# Patient Record
Sex: MALE | Race: WHITE | NOT HISPANIC OR LATINO
[De-identification: names, ages, dates, MRNs, and addresses within clinical notes are randomized per-mention and may not be internally consistent; named-entity substitution may affect disease eponyms.]

---

## 2017-01-23 ENCOUNTER — APPOINTMENT (OUTPATIENT)
Dept: PULMONOLOGY | Facility: CLINIC | Age: 82
End: 2017-01-23

## 2017-01-23 VITALS
HEIGHT: 74 IN | WEIGHT: 203 LBS | BODY MASS INDEX: 26.05 KG/M2 | RESPIRATION RATE: 16 BRPM | HEART RATE: 77 BPM | DIASTOLIC BLOOD PRESSURE: 70 MMHG | SYSTOLIC BLOOD PRESSURE: 140 MMHG | OXYGEN SATURATION: 97 %

## 2017-06-05 ENCOUNTER — APPOINTMENT (OUTPATIENT)
Dept: PULMONOLOGY | Facility: CLINIC | Age: 82
End: 2017-06-05

## 2017-06-05 VITALS
RESPIRATION RATE: 17 BRPM | HEIGHT: 74 IN | WEIGHT: 206 LBS | SYSTOLIC BLOOD PRESSURE: 120 MMHG | HEART RATE: 86 BPM | DIASTOLIC BLOOD PRESSURE: 80 MMHG | OXYGEN SATURATION: 95 % | BODY MASS INDEX: 26.44 KG/M2

## 2017-10-05 ENCOUNTER — APPOINTMENT (OUTPATIENT)
Dept: PULMONOLOGY | Facility: CLINIC | Age: 82
End: 2017-10-05
Payer: MEDICARE

## 2017-10-05 VITALS
WEIGHT: 190 LBS | BODY MASS INDEX: 24.38 KG/M2 | OXYGEN SATURATION: 97 % | HEART RATE: 70 BPM | HEIGHT: 74 IN | DIASTOLIC BLOOD PRESSURE: 70 MMHG | RESPIRATION RATE: 17 BRPM | SYSTOLIC BLOOD PRESSURE: 122 MMHG

## 2017-10-05 PROCEDURE — 94729 DIFFUSING CAPACITY: CPT

## 2017-10-05 PROCEDURE — 94010 BREATHING CAPACITY TEST: CPT | Mod: 59

## 2017-10-05 PROCEDURE — 90662 IIV NO PRSV INCREASED AG IM: CPT

## 2017-10-05 PROCEDURE — 94620 PULMONARY STRESS TESTING SIMPLE: CPT

## 2017-10-05 PROCEDURE — 99214 OFFICE O/P EST MOD 30 MIN: CPT | Mod: 25

## 2017-10-05 PROCEDURE — G0008: CPT

## 2018-02-06 ENCOUNTER — APPOINTMENT (OUTPATIENT)
Dept: PULMONOLOGY | Facility: CLINIC | Age: 83
End: 2018-02-06
Payer: MEDICARE

## 2018-02-06 VITALS
DIASTOLIC BLOOD PRESSURE: 58 MMHG | SYSTOLIC BLOOD PRESSURE: 106 MMHG | WEIGHT: 190 LBS | BODY MASS INDEX: 25.73 KG/M2 | RESPIRATION RATE: 17 BRPM | HEIGHT: 72 IN | HEART RATE: 83 BPM | OXYGEN SATURATION: 97 %

## 2018-02-06 PROCEDURE — 99214 OFFICE O/P EST MOD 30 MIN: CPT

## 2018-06-07 ENCOUNTER — NON-APPOINTMENT (OUTPATIENT)
Age: 83
End: 2018-06-07

## 2018-06-07 ENCOUNTER — APPOINTMENT (OUTPATIENT)
Dept: PULMONOLOGY | Facility: CLINIC | Age: 83
End: 2018-06-07
Payer: MEDICARE

## 2018-06-07 VITALS
HEART RATE: 89 BPM | BODY MASS INDEX: 26.55 KG/M2 | DIASTOLIC BLOOD PRESSURE: 78 MMHG | RESPIRATION RATE: 17 BRPM | HEIGHT: 72 IN | SYSTOLIC BLOOD PRESSURE: 124 MMHG | WEIGHT: 196 LBS | OXYGEN SATURATION: 95 %

## 2018-06-07 PROCEDURE — 94010 BREATHING CAPACITY TEST: CPT

## 2018-06-07 PROCEDURE — 99214 OFFICE O/P EST MOD 30 MIN: CPT | Mod: 25
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
## 2018-09-16 ENCOUNTER — TRANSCRIPTION ENCOUNTER (OUTPATIENT)
Age: 83
End: 2018-09-16

## 2018-09-17 ENCOUNTER — OUTPATIENT (OUTPATIENT)
Dept: OUTPATIENT SERVICES | Facility: HOSPITAL | Age: 83
LOS: 1 days | End: 2018-09-17
Payer: MEDICARE

## 2018-09-17 VITALS
SYSTOLIC BLOOD PRESSURE: 169 MMHG | DIASTOLIC BLOOD PRESSURE: 77 MMHG | WEIGHT: 200.18 LBS | OXYGEN SATURATION: 97 % | TEMPERATURE: 98 F | HEART RATE: 79 BPM | HEIGHT: 69.5 IN | RESPIRATION RATE: 20 BRPM

## 2018-09-17 VITALS
HEART RATE: 84 BPM | OXYGEN SATURATION: 96 % | RESPIRATION RATE: 15 BRPM | SYSTOLIC BLOOD PRESSURE: 146 MMHG | DIASTOLIC BLOOD PRESSURE: 92 MMHG

## 2018-09-17 DIAGNOSIS — Z41.9 ENCOUNTER FOR PROCEDURE FOR PURPOSES OTHER THAN REMEDYING HEALTH STATE, UNSPECIFIED: Chronic | ICD-10-CM

## 2018-09-17 DIAGNOSIS — Z95.9 PRESENCE OF CARDIAC AND VASCULAR IMPLANT AND GRAFT, UNSPECIFIED: Chronic | ICD-10-CM

## 2018-09-17 DIAGNOSIS — Z96.659 PRESENCE OF UNSPECIFIED ARTIFICIAL KNEE JOINT: Chronic | ICD-10-CM

## 2018-09-17 DIAGNOSIS — H25.12 AGE-RELATED NUCLEAR CATARACT, LEFT EYE: ICD-10-CM

## 2018-09-17 PROCEDURE — V2632: CPT

## 2018-09-17 PROCEDURE — 66984 XCAPSL CTRC RMVL W/O ECP: CPT | Mod: LT

## 2018-09-17 NOTE — ASU DISCHARGE PLAN (ADULT/PEDIATRIC). - PT EDUC
other (specify)/Implant card (specify) other (specify)/Implant card (specify)/Intraocular lens implant (IOL) Eye shield with instructions , sunglasses and eye kit given to patient. Left eye phacoemulsification cataract extraction with Intraocular Lens implant

## 2018-09-17 NOTE — ASU DISCHARGE PLAN (ADULT/PEDIATRIC). - SPECIAL INSTRUCTIONS
No bending, No heavy lifting.   Continue with current eyedrops.  Please wear clear shield at bed time for the first week.  Doctor will make arrangement for follow up.

## 2018-09-24 PROBLEM — I25.10 ATHEROSCLEROTIC HEART DISEASE OF NATIVE CORONARY ARTERY WITHOUT ANGINA PECTORIS: Chronic | Status: ACTIVE | Noted: 2018-09-17

## 2018-09-24 PROBLEM — G47.30 SLEEP APNEA, UNSPECIFIED: Chronic | Status: ACTIVE | Noted: 2018-09-17

## 2018-09-24 PROBLEM — Z87.19 PERSONAL HISTORY OF OTHER DISEASES OF THE DIGESTIVE SYSTEM: Chronic | Status: ACTIVE | Noted: 2018-09-17
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
## 2018-09-24 PROBLEM — N36.9 URETHRAL DISORDER, UNSPECIFIED: Chronic | Status: ACTIVE | Noted: 2018-09-17

## 2018-09-24 PROBLEM — I10 ESSENTIAL (PRIMARY) HYPERTENSION: Chronic | Status: ACTIVE | Noted: 2018-09-17

## 2018-09-24 PROBLEM — J84.10 PULMONARY FIBROSIS, UNSPECIFIED: Chronic | Status: ACTIVE | Noted: 2018-09-17
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
## 2018-09-30 ENCOUNTER — TRANSCRIPTION ENCOUNTER (OUTPATIENT)
Age: 83
End: 2018-09-30

## 2018-10-01 ENCOUNTER — OUTPATIENT (OUTPATIENT)
Dept: OUTPATIENT SERVICES | Facility: HOSPITAL | Age: 83
LOS: 1 days | End: 2018-10-01
Payer: MEDICARE

## 2018-10-01 VITALS
HEART RATE: 76 BPM | DIASTOLIC BLOOD PRESSURE: 58 MMHG | OXYGEN SATURATION: 99 % | SYSTOLIC BLOOD PRESSURE: 143 MMHG | RESPIRATION RATE: 23 BRPM

## 2018-10-01 VITALS
HEIGHT: 71 IN | DIASTOLIC BLOOD PRESSURE: 64 MMHG | SYSTOLIC BLOOD PRESSURE: 173 MMHG | RESPIRATION RATE: 18 BRPM | TEMPERATURE: 98 F | HEART RATE: 72 BPM | WEIGHT: 197.98 LBS | OXYGEN SATURATION: 100 %

## 2018-10-01 DIAGNOSIS — Z96.659 PRESENCE OF UNSPECIFIED ARTIFICIAL KNEE JOINT: Chronic | ICD-10-CM

## 2018-10-01 DIAGNOSIS — Z98.49 CATARACT EXTRACTION STATUS, UNSPECIFIED EYE: Chronic | ICD-10-CM

## 2018-10-01 DIAGNOSIS — Z41.9 ENCOUNTER FOR PROCEDURE FOR PURPOSES OTHER THAN REMEDYING HEALTH STATE, UNSPECIFIED: Chronic | ICD-10-CM

## 2018-10-01 DIAGNOSIS — H25.11 AGE-RELATED NUCLEAR CATARACT, RIGHT EYE: ICD-10-CM

## 2018-10-01 DIAGNOSIS — Z95.9 PRESENCE OF CARDIAC AND VASCULAR IMPLANT AND GRAFT, UNSPECIFIED: Chronic | ICD-10-CM

## 2018-10-01 PROCEDURE — 66984 XCAPSL CTRC RMVL W/O ECP: CPT | Mod: RT

## 2018-10-01 PROCEDURE — V2632: CPT

## 2018-10-01 NOTE — ASU DISCHARGE PLAN (ADULT/PEDIATRIC). - SPECIAL INSTRUCTIONS
No bending, No heavy lifting.   Continue with current eyedrops.  Please wear clear shield at bed time for the first week.  Doctor will make arrangement to see the patient(home visit).

## 2018-10-09 ENCOUNTER — APPOINTMENT (OUTPATIENT)
Dept: PULMONOLOGY | Facility: CLINIC | Age: 83
End: 2018-10-09

## 2018-10-12 ENCOUNTER — APPOINTMENT (OUTPATIENT)
Dept: PULMONOLOGY | Facility: CLINIC | Age: 83
End: 2018-10-12
Payer: MEDICARE

## 2018-10-12 ENCOUNTER — NON-APPOINTMENT (OUTPATIENT)
Age: 83
End: 2018-10-12

## 2018-10-12 VITALS
OXYGEN SATURATION: 94 % | BODY MASS INDEX: 34.73 KG/M2 | WEIGHT: 196 LBS | HEART RATE: 85 BPM | RESPIRATION RATE: 18 BRPM | SYSTOLIC BLOOD PRESSURE: 150 MMHG | DIASTOLIC BLOOD PRESSURE: 70 MMHG | HEIGHT: 63 IN

## 2018-10-12 PROCEDURE — 94010 BREATHING CAPACITY TEST: CPT

## 2018-10-12 PROCEDURE — 99214 OFFICE O/P EST MOD 30 MIN: CPT | Mod: 25

## 2018-10-12 PROCEDURE — 99070 SPECIAL SUPPLIES PHYS/QHP: CPT

## 2018-10-12 RX ORDER — AMITRIPTYLINE HYDROCHLORIDE 10 MG/1
10 TABLET, FILM COATED ORAL 3 TIMES DAILY
Qty: 1 | Refills: 5 | Status: DISCONTINUED | COMMUNITY
Start: 2018-06-07 | End: 2018-10-12

## 2018-10-12 RX ORDER — ATENOLOL 25 MG/1
25 TABLET ORAL
Refills: 0 | Status: ACTIVE | COMMUNITY

## 2018-11-16 ENCOUNTER — APPOINTMENT (OUTPATIENT)
Dept: PULMONOLOGY | Facility: CLINIC | Age: 83
End: 2018-11-16
Payer: MEDICARE

## 2018-11-16 VITALS
WEIGHT: 197 LBS | BODY MASS INDEX: 25.28 KG/M2 | HEIGHT: 74 IN | RESPIRATION RATE: 17 BRPM | DIASTOLIC BLOOD PRESSURE: 60 MMHG | SYSTOLIC BLOOD PRESSURE: 126 MMHG | HEART RATE: 75 BPM | OXYGEN SATURATION: 97 %

## 2018-11-16 PROCEDURE — 99214 OFFICE O/P EST MOD 30 MIN: CPT | Mod: 25

## 2018-11-16 PROCEDURE — 71046 X-RAY EXAM CHEST 2 VIEWS: CPT

## 2018-11-16 RX ORDER — GABAPENTIN 100 MG/1
100 CAPSULE ORAL
Qty: 180 | Refills: 1 | Status: ACTIVE | COMMUNITY
Start: 2018-11-16 | End: 1900-01-01

## 2018-11-16 NOTE — ADDENDUM
[FreeTextEntry1] : All medical record entries made by khang Cardenas were at Dr. Talon Crowell's, direction and personally dictated by me on (11/16/2018). I have reviewed the chart and agree that the record accurately reflects my personal performance of the history, physical exam, assessment and plan. I have also personally directed, reviewed, and agree with the discharge instructions.

## 2018-11-16 NOTE — HISTORY OF PRESENT ILLNESS
[FreeTextEntry1] : Mr. Benítez is a 83 year old male with a history of abnormal chest CT, bronchiectasis, COPD with asthma, GERD, ILDz, lung nodule, VANE, PND, PNA and SOB presenting to the office today for a follow up visit. His chief complaint is cough,. \par -he had a recent episode of PNA, and has still been coughing \par -he states that his coughing has been constant, and that he has been bringing up some mucous\par -he states that the cold air has been making him cough even more\par -he states that his energy level has returned to normal since having PNA\par -he state that he had not been feeling well when he was on amitriptyline\par -he notes that he has not been using his nebulizer regularly\par -he denies any headaches, nausea, vomiting, fever, chills, sweats, chest pain, chest pressure, diarrhea, constipation, dysphagia, dizziness, leg swelling, leg pain, itchy eyes, itchy ears, heartburn, reflux, or sour taste in the mouth.

## 2018-11-16 NOTE — PROCEDURE
[FreeTextEntry1] : Chest CT (September 28, 2018) reveals stable moderate degree of pulmonary intertitial fibrosis. \par \par \par CXR reveals a normal sized heart;  mildly increased interstitial markings b/l, left greater than right. calcified aortic knob.

## 2018-11-16 NOTE — REASON FOR VISIT
[Follow-Up] : a follow-up visit [FreeTextEntry1] : abnormal chest CT, bronchiectasis, COPD with asthma, GERD, ILDz, lung nodule, VANE, PND, PNA and SOB

## 2018-11-16 NOTE — ASSESSMENT
[FreeTextEntry1] : Mr. Benítez is a 83 year old male with a history of ILDz, COPD, asbestos exposure is stable from a pulmonary perspective, with some dyspnea when walking up stairs or riding bike up inclines. He presents with  a possible flair of COPD or UIP. \par \par His shortness of breath is multifactorial due to:\par -mild pulmonary fibrosis, stable \par -COPD\par -pulmonary hypertension\par -overweight\par -poor breathing mechanics\par \par problem 1: COPD (flair) \par -add Prednisone 20 mg x 7 days, 10 mg x 7 days \par Information sheet given to the patient to be reviewed, this medication is never to be used without consulting the prescribing physician. Proper dietary restraint is necessary specifically salt containing foods, if any reaction may occur should be reported.\par \par -continue to use Anoro at 1 inhalation QD\par -add Albuterol via Hand held nebulizer, Q6H \par -COPD is a progressive disease and although it can’t be cured , appropriate management can slow its progression, reduce frequency and severity of exacerbations, and improve symptoms and the patient quality of life. Hospitalizations are the greatest contributor to the total COPD costs and account for up to 87% of total COPD related costs. Exacerbations are the main cause of admissions and subsequently account for the 40-75% of COPD costs. Inhaled maintenance therapy reduces the incidence of exacerbations in patients with stable COPD. Incorrect inhaler use and nonadherence are major obstacles to achieving COPD treatment goals. Many COPD patients have challenges (impaired inhalation, limited dexterity, reduced cognition: that limit their ability to correctly use their COPD treatment devices resulting in reduced symptom control. Of most importance is smoking cessation and early intervention with respiratory illnesses and contemplation for pulmonary rehab to enhance quality of life. \par -Inhaler technique reviewed as well as oral hygiene techniques reviewed with patient. Avoidance of cold air, extremes of temperature, rescue inhaler should be used before exercise. Order of medication reviewed with patient. Recommended use of a cool mist humidifier in the bedroom. \par \par problem 2: asbestos exposure and former smoking, stable\par -follow up chest CT in August 2019\par \par problem 3: VANE\par -continue to use Breath Right strips\par -Provent is recommended \par -recommended to use a Oxy-Aid by [employer]\par -Discussed the risks/associations with coronary artery disease, atrial fibrillation, arrhythmia, memory loss, issues with concentration, stroke risk, hypertension, nocturia, chronic reflux/Disla’s esophagus some but not all inclusive. Treatment options discussed including CPAP/BiPAP machine, oral appliance, ProVent therapy, Oxy-Aid by [employer], new technologies, or positional sleep.\par \par problem 4: overweight\par -Weight loss, exercise, and diet control were discussed and are highly encouraged. Treatment options were given such as, aqua therapy, and contacting a nutritionist. Recommended to use the elliptical, stationary bike, less use of treadmill. \par \par problem 5: cough /  sensory neuropathic cough\par -recommended to use a humidifier in the bedroom\par -recommended to hydrate\par -recommended to use Mouth Kote \par -Xlear \par -add Neurontin 100 mg up to TID\par \par problem 6: pulmonary fibrosis\par -no therapy indicated at this point\par -no change on CT and asymptomatic \par \par problem 7: Bronchiectasis\par -acapella device recommended\par \par problem 8: GERD\par -continue Nexium 40 mg QD\par \par problem 9: health maintenance \par -s/p influenza vaccine \par -recommended strep pneumonia vaccines: Prevnar-13 vaccine, followed by Pneumo vaccine 23 one year following\par -recommended early intervention for URIs\par -recommended regular osteoporosis evaluations\par -recommended early dermatological evaluations\par -recommended after the age of 50 to the age of 70, colonoscopy every 5 years \par \par \par F/U in 4 months\par -with 4 PFTs and 6 minute walk\par He is encouraged to call with any changes, concerns, or questions

## 2018-11-16 NOTE — PHYSICAL EXAM

## 2019-01-16 ENCOUNTER — APPOINTMENT (OUTPATIENT)
Dept: PULMONOLOGY | Facility: CLINIC | Age: 84
End: 2019-01-16
Payer: MEDICARE

## 2019-01-16 VITALS
HEART RATE: 75 BPM | SYSTOLIC BLOOD PRESSURE: 132 MMHG | WEIGHT: 199 LBS | OXYGEN SATURATION: 99 % | BODY MASS INDEX: 25.54 KG/M2 | HEIGHT: 74 IN | DIASTOLIC BLOOD PRESSURE: 62 MMHG | RESPIRATION RATE: 17 BRPM

## 2019-01-16 PROCEDURE — 94729 DIFFUSING CAPACITY: CPT

## 2019-01-16 PROCEDURE — 94060 EVALUATION OF WHEEZING: CPT | Mod: 59

## 2019-01-16 PROCEDURE — 99214 OFFICE O/P EST MOD 30 MIN: CPT | Mod: 25

## 2019-01-16 PROCEDURE — 94618 PULMONARY STRESS TESTING: CPT

## 2019-01-16 PROCEDURE — 94726 PLETHYSMOGRAPHY LUNG VOLUMES: CPT

## 2019-01-16 RX ORDER — PREDNISONE 10 MG/1
10 TABLET ORAL
Qty: 100 | Refills: 0 | Status: DISCONTINUED | COMMUNITY
Start: 2018-11-16 | End: 2019-01-16

## 2019-01-16 NOTE — PROCEDURE
[FreeTextEntry1] : PFT - spi reveals normal flows; FEV1 is 3.10 which is 86% of predicted, normal flow volume loop. 19% improvement with bronchodilators at mid-low lung volumes. Normal lung volumes / Mildly reduced diffusion, DLCO is 13.1 which is 63% of predicted. \par \par 6 minute walk test reveals a low saturation of 89% with somewhat severe evidence of dyspnea or fatigue; walked  342.3 meters

## 2019-01-16 NOTE — ADDENDUM
[FreeTextEntry1] : All medical record entries made by khang Cardenas were at Dr. Talon Crowell's, direction and personally dictated by me on 01/16/2019. I have reviewed the chart and agree that the record accurately reflects my personal performance of the history, physical exam, assessment and plan. I have also personally directed, reviewed, and agree with the discharge instructions.

## 2019-01-16 NOTE — PHYSICAL EXAM

## 2019-01-16 NOTE — ASSESSMENT
[FreeTextEntry1] : Mr. Benítez is a 83 year old male with a history of ILDz, COPD, asbestos exposure is stable from a pulmonary perspective, with some dyspnea when walking up stairs or riding bike up inclines. He presents s/p flair of COPD or UIP, now 50% of prior\par \par His shortness of breath is multifactorial due to:\par -mild pulmonary fibrosis, stable \par -COPD\par -pulmonary hypertension\par -overweight\par -poor breathing mechanics\par \par problem 1: COPD (flair) \par -continue to use Anoro at 1 inhalation QD\par -continue Albuterol via Hand held nebulizer, Q6H \par -COPD is a progressive disease and although it can’t be cured , appropriate management can slow its progression, reduce frequency and severity of exacerbations, and improve symptoms and the patient quality of life. Hospitalizations are the greatest contributor to the total COPD costs and account for up to 87% of total COPD related costs. Exacerbations are the main cause of admissions and subsequently account for the 40-75% of COPD costs. Inhaled maintenance therapy reduces the incidence of exacerbations in patients with stable COPD. Incorrect inhaler use and nonadherence are major obstacles to achieving COPD treatment goals. Many COPD patients have challenges (impaired inhalation, limited dexterity, reduced cognition: that limit their ability to correctly use their COPD treatment devices resulting in reduced symptom control. Of most importance is smoking cessation and early intervention with respiratory illnesses and contemplation for pulmonary rehab to enhance quality of life. \par -Inhaler technique reviewed as well as oral hygiene techniques reviewed with patient. Avoidance of cold air, extremes of temperature, rescue inhaler should be used before exercise. Order of medication reviewed with patient. Recommended use of a cool mist humidifier in the bedroom. \par \par problem 2: asbestos exposure and former smoking, stable\par -follow up chest CT in August 2019\par \par problem 3: VANE\par -continue to use Breath Right strips\par -Provent is recommended \par -recommended to use a Oxy-Aid by [employer]\par -Discussed the risks/associations with coronary artery disease, atrial fibrillation, arrhythmia, memory loss, issues with concentration, stroke risk, hypertension, nocturia, chronic reflux/Disla’s esophagus some but not all inclusive. Treatment options discussed including CPAP/BiPAP machine, oral appliance, ProVent therapy, Oxy-Aid by [employer], new technologies, or positional sleep.\par \par problem 4: overweight\par -Weight loss, exercise, and diet control were discussed and are highly encouraged. Treatment options were given such as, aqua therapy, and contacting a nutritionist. Recommended to use the elliptical, stationary bike, less use of treadmill. \par \par problem 5: cough /  sensory neuropathic cough\par -recommended to use a humidifier in the bedroom\par -recommended to hydrate\par -recommended to use Mouth Kote \par -Xlear \par -add Neurontin 100 mg up to TID\par \par problem 6: pulmonary fibrosis\par -no therapy indicated at this point\par -no change on CT and asymptomatic \par \par problem 7: Bronchiectasis\par -acapella device recommended\par \par problem 8: GERD\par -continue Nexium 40 mg QD\par \par problem 9: health maintenance \par -s/p influenza vaccine - 2018\par -recommended strep pneumonia vaccines: Prevnar-13 vaccine, followed by Pneumo vaccine 23 one year following\par -recommended early intervention for URIs\par -recommended regular osteoporosis evaluations\par -recommended early dermatological evaluations\par -recommended after the age of 50 to the age of 70, colonoscopy every 5 years \par \par \par F/U in 4 months\par -with 4 PFTs and 6 minute walk\par He is encouraged to call with any changes, concerns, or questions

## 2019-01-16 NOTE — HISTORY OF PRESENT ILLNESS
[FreeTextEntry1] : Mr. Benítez is a 83 year old male with a history of abnormal chest CT, bronchiectasis, COPD, GERD, ILDz, lung nodule, VANE, PND, PNA and SOB presenting to the office today for a follow up visit. His chief complaint is SOB, s/p PNA (Oct-Nov, 2018)\par -he states that he has not felt the same since his episode of pneumonia. while his cough has improved, it is still present. he still gets short of breath after walking up a flight of stairs. he believes he is about 75% improved and back to normal following his PNA\par -he notes that he has been controlling his reflux with Nexium\par -he reports that he intermittently gets poor sleep, as he will wake up in the middle of the night and not be able to fall asleep\par -he has not been bringing up any mucus\par -he denies any headaches, nausea, vomiting, fever, chills, sweats, chest pain, chest pressure, diarrhea, constipation, dysphagia, dizziness, leg swelling, leg pain, itchy eyes, itchy ears, heartburn, reflux, or sour taste in the mouth.

## 2019-05-01 ENCOUNTER — RX RENEWAL (OUTPATIENT)
Age: 84
End: 2019-05-01

## 2019-05-01 RX ORDER — ARFORMOTEROL TARTRATE 15 UG/2ML
15 SOLUTION RESPIRATORY (INHALATION)
Qty: 60 | Refills: 3 | Status: DISCONTINUED | COMMUNITY
Start: 2019-04-30 | End: 2019-05-01

## 2019-05-17 ENCOUNTER — NON-APPOINTMENT (OUTPATIENT)
Age: 84
End: 2019-05-17

## 2019-05-17 ENCOUNTER — APPOINTMENT (OUTPATIENT)
Dept: PULMONOLOGY | Facility: CLINIC | Age: 84
End: 2019-05-17
Payer: MEDICARE

## 2019-05-17 ENCOUNTER — RX RENEWAL (OUTPATIENT)
Age: 84
End: 2019-05-17

## 2019-05-17 VITALS
SYSTOLIC BLOOD PRESSURE: 120 MMHG | HEART RATE: 84 BPM | BODY MASS INDEX: 25.54 KG/M2 | RESPIRATION RATE: 16 BRPM | DIASTOLIC BLOOD PRESSURE: 80 MMHG | WEIGHT: 199 LBS | HEIGHT: 74 IN | OXYGEN SATURATION: 94 %

## 2019-05-17 DIAGNOSIS — Z87.01 PERSONAL HISTORY OF PNEUMONIA (RECURRENT): ICD-10-CM

## 2019-05-17 PROCEDURE — 99214 OFFICE O/P EST MOD 30 MIN: CPT | Mod: 25

## 2019-05-17 PROCEDURE — 94010 BREATHING CAPACITY TEST: CPT

## 2019-05-17 NOTE — PHYSICAL EXAM
[General Appearance - Well Developed] : well developed [Normal Appearance] : normal appearance [No Deformities] : no deformities [General Appearance - Well Nourished] : well nourished [Well Groomed] : well groomed [Normal Conjunctiva] : the conjunctiva exhibited no abnormalities [General Appearance - In No Acute Distress] : no acute distress [Normal Oropharynx] : normal oropharynx [Eyelids - No Xanthelasma] : the eyelids demonstrated no xanthelasmas [Neck Cervical Mass (___cm)] : no neck mass was observed [Jugular Venous Distention Increased] : there was no jugular-venous distention [Neck Appearance] : the appearance of the neck was normal [Thyroid Nodule] : there were no palpable thyroid nodules [Thyroid Diffuse Enlargement] : the thyroid was not enlarged [Heart Rate And Rhythm] : heart rate and rhythm were normal [Murmurs] : no murmurs present [Heart Sounds] : normal S1 and S2 [Respiration, Rhythm And Depth] : normal respiratory rhythm and effort [Auscultation Breath Sounds / Voice Sounds] : lungs were clear to auscultation bilaterally [Exaggerated Use Of Accessory Muscles For Inspiration] : no accessory muscle use [Abdomen Tenderness] : non-tender [Abdomen Soft] : soft [Abdomen Mass (___ Cm)] : no abdominal mass palpated [Gait - Sufficient For Exercise Testing] : the gait was sufficient for exercise testing [Abnormal Walk] : normal gait [Nail Clubbing] : no clubbing of the fingernails [Cyanosis, Localized] : no localized cyanosis [Petechial Hemorrhages (___cm)] : no petechial hemorrhages [Skin Color & Pigmentation] : normal skin color and pigmentation [] : no rash [No Venous Stasis] : no venous stasis [Skin Lesions] : no skin lesions [No Skin Ulcers] : no skin ulcer [Deep Tendon Reflexes (DTR)] : deep tendon reflexes were 2+ and symmetric [Sensation] : the sensory exam was normal to light touch and pinprick [No Xanthoma] : no  xanthoma was observed [Oriented To Time, Place, And Person] : oriented to person, place, and time [No Focal Deficits] : no focal deficits [Impaired Insight] : insight and judgment were intact [Affect] : the affect was normal [II] : II [FreeTextEntry1] : I:E ratio 1:3; clear

## 2019-05-17 NOTE — ASSESSMENT
[FreeTextEntry1] : Mr. Benítez is a 83 year old male with a history of ILDz, ?UIP, COPD, asbestos exposure is stable from a pulmonary perspective, with some dyspnea when walking up stairs or riding bike up inclines. He presents mildly symptomatic with SOB and a cough. \par \par His shortness of breath is multifactorial due to:\par -mild pulmonary fibrosis, stable \par -COPD\par -pulmonary hypertension\par -overweight\par -poor breathing mechanics\par \par problem 1: COPD\par -continue to use Anoro at 1 inhalation QD\par -continue Albuterol via Hand held nebulizer, Q6H \par -COPD is a progressive disease and although it can’t be cured , appropriate management can slow its progression, reduce frequency and severity of exacerbations, and improve symptoms and the patient quality of life. Hospitalizations are the greatest contributor to the total COPD costs and account for up to 87% of total COPD related costs. Exacerbations are the main cause of admissions and subsequently account for the 40-75% of COPD costs. Inhaled maintenance therapy reduces the incidence of exacerbations in patients with stable COPD. Incorrect inhaler use and nonadherence are major obstacles to achieving COPD treatment goals. Many COPD patients have challenges (impaired inhalation, limited dexterity, reduced cognition: that limit their ability to correctly use their COPD treatment devices resulting in reduced symptom control. Of most importance is smoking cessation and early intervention with respiratory illnesses and contemplation for pulmonary rehab to enhance quality of life. \par -Inhaler technique reviewed as well as oral hygiene techniques reviewed with patient. Avoidance of cold air, extremes of temperature, rescue inhaler should be used before exercise. Order of medication reviewed with patient. Recommended use of a cool mist humidifier in the bedroom. \par \par problem 2: asbestos exposure and former smoking, stable (lung cancer screening)\par -follow up chest CT in 5/2020\par \par problem 3: VANE\par -continue to use Breath Right strips\par -Provent is recommended \par -recommended to use a Oxy-Aid by [employer]\par -Discussed the risks/associations with coronary artery disease, atrial fibrillation, arrhythmia, memory loss, issues with concentration, stroke risk, hypertension, nocturia, chronic reflux/Disla’s esophagus some but not all inclusive. Treatment options discussed including CPAP/BiPAP machine, oral appliance, ProVent therapy, Oxy-Aid by [employer], new technologies, or positional sleep.\par \par problem 4: overweight\par -Weight loss, exercise, and diet control were discussed and are highly encouraged. Treatment options were given such as, aqua therapy, and contacting a nutritionist. Recommended to use the elliptical, stationary bike, less use of treadmill. \par \par problem 5: cough /  sensory neuropathic cough\par -recommended to use a humidifier in the bedroom\par -recommended to hydrate\par -recommended to use Mouth Kote \par -Xlear \par -continue Neurontin 100 mg up to TID vs. -add Amitriptyline 10 mg up to TID, QHS \par \par problem 6: pulmonary fibrosis / UIP\par -initiate Ofev 150 mg BID (get regular LFTs)\par -no therapy indicated at this point\par -no change on CT from 2015-present\par \par problem 7: Bronchiectasis\par -acapella device recommended\par Seen on the CT of the chest or chest x-ray signifies damaged bronchial tubes focal or diffuse which can be sites of recurrent infections. These areas can be colonized by various organisms including bacteria (hemophilous influenzae/Pseudomonas species etc.) as well as acid fast bacilli (mycobacterial disease- inclusive of TB/OSCAR etc.). Sputum either for bacteria culture/sensitivity or AFB culture and sensitivity will need to be sent if the patient has sputum- 3 specimens on consecutive days will need to be dropped at the laboratory- if the patient can produce sputum \par \par problem 8: GERD\par -continue Nexium 40 mg QD\par Things to avoid including overeating, spicy foods, tight clothing, eating within three hours of bed, this list is not all inclusive. \par -Rule of 2's: avoid eating too much, eating too late, eating too spicy, eating two hours before bed\par -For treatment of reflux, possible options discussed including diet control, H2 blockers, PPIs, as well as coating motility agents discussed as treatment options. Timing of meals and proximity of last meal to sleep were discussed. If symptoms persist, a formal gastrointestinal evaluation is needed. \par \par problem 9: health maintenance \par -s/p influenza vaccine - 2018\par -recommended strep pneumonia vaccines: Prevnar-13 vaccine, followed by Pneumo vaccine 23 one year following\par -recommended early intervention for URIs\par -recommended regular osteoporosis evaluations\par -recommended early dermatological evaluations\par -recommended after the age of 50 to the age of 70, colonoscopy every 5 years \par \par \par F/U in 4 months\par -with 4 PFTs and 6 minute walk\par He is encouraged to call with any changes, concerns, or questions

## 2019-05-17 NOTE — HISTORY OF PRESENT ILLNESS
[FreeTextEntry1] : Mr. Benítez is a 83 year old male with a history of abnormal chest CT, bronchiectasis, COPD, GERD, ILDz. lung nodules, VANE, PND, UIP, and SOB presenting to the office today for a follow up visit. His chief complaint is SOB / cough.\par -He states that he has generally been feeling well  although he has still been getting SOB frequently, and his productive cough is still present\par -he reports a sour taste in his mouth. he has been taking Nexium to control his reflux\par -he states that that he recently had a bad reaction to Prednisone, where his blood sugar became abnormal and his vision was poor. \par -he denies any headaches, nausea, vomiting, fever, chills, sweats, chest pain, chest pressure, diarrhea, constipation, dysphagia, dizziness, leg swelling, leg pain, itchy eyes, itchy ears, heartburn, reflux.

## 2019-05-17 NOTE — PROCEDURE
[FreeTextEntry1] : Chest CT (May 3, 2019) reveals stable interstitial fibrotic changes/ lung nodules. diffuse interstitial fibrotic changed periphery of both lungs more diffuse at the lower lung zones, again associated with moderate elements of honeycombing the affected parenchyma which appears essentially stable in degree since the recent prior studies dating back to 2017.\par \par PFT - spi reveals normal flows; FEV1 is 3.01 which is 95% of predicted, normal flow volume loop \par \par 6 minute walk test reveals a low saturation of 87% with no evidence of dyspnea or fatigue; walked 195.6 meters. Test stopped after 3 minutes

## 2019-05-17 NOTE — ADDENDUM
[FreeTextEntry1] : All medical record entries made by khang Cardenas were at Dr. Talon Crowell's, direction and personally dictated by me on 05/17/2019. I have reviewed the chart and agree that the record accurately reflects my personal performance of the history, physical exam, assessment and plan. I have also personally directed, reviewed, and agree with the discharge instructions.

## 2019-05-17 NOTE — REASON FOR VISIT
[Follow-Up] : a follow-up visit [FreeTextEntry1] : abnormal chest CT, bronchiectasis, COPD, GERD, ILDz. lung nodules, VANE, PND, UIP, and SOB

## 2019-05-28 ENCOUNTER — MEDICATION RENEWAL (OUTPATIENT)
Age: 84
End: 2019-05-28

## 2019-06-03 ENCOUNTER — MEDICATION RENEWAL (OUTPATIENT)
Age: 84
End: 2019-06-03

## 2019-06-19 DIAGNOSIS — Z51.81 ENCOUNTER FOR THERAPEUTIC DRUG LVL MONITORING: ICD-10-CM

## 2019-07-03 ENCOUNTER — NON-APPOINTMENT (OUTPATIENT)
Age: 84
End: 2019-07-03

## 2019-07-03 ENCOUNTER — APPOINTMENT (OUTPATIENT)
Dept: PULMONOLOGY | Facility: CLINIC | Age: 84
End: 2019-07-03
Payer: MEDICARE

## 2019-07-03 VITALS
HEIGHT: 74 IN | SYSTOLIC BLOOD PRESSURE: 120 MMHG | OXYGEN SATURATION: 97 % | DIASTOLIC BLOOD PRESSURE: 80 MMHG | WEIGHT: 198 LBS | HEART RATE: 73 BPM | BODY MASS INDEX: 25.41 KG/M2 | RESPIRATION RATE: 16 BRPM

## 2019-07-03 PROCEDURE — 99214 OFFICE O/P EST MOD 30 MIN: CPT | Mod: 25

## 2019-07-03 PROCEDURE — 94010 BREATHING CAPACITY TEST: CPT

## 2019-07-03 NOTE — ASSESSMENT
[FreeTextEntry1] : Mr. Benítez is a 83 year old male with a history of ILDz, ?UIP, COPD, asbestos exposure is stable from a pulmonary perspective, with some dyspnea when walking up stairs or riding bike up inclines. He presents mildly symptomatic with SOB and a cough - He is currently on Ofev therapy for 2+ weeks.\par \par His shortness of breath is multifactorial due to:\par -mild pulmonary fibrosis, stable \par -COPD\par -pulmonary hypertension\par -overweight\par -poor breathing mechanics\par \par problem 1: COPD\par -continue to use Anoro at 1 inhalation QD\par -continue Albuterol via Hand held nebulizer, Q6H \par -COPD is a progressive disease and although it can’t be cured , appropriate management can slow its progression, reduce frequency and severity of exacerbations, and improve symptoms and the patient quality of life. Hospitalizations are the greatest contributor to the total COPD costs and account for up to 87% of total COPD related costs. Exacerbations are the main cause of admissions and subsequently account for the 40-75% of COPD costs. Inhaled maintenance therapy reduces the incidence of exacerbations in patients with stable COPD. Incorrect inhaler use and nonadherence are major obstacles to achieving COPD treatment goals. Many COPD patients have challenges (impaired inhalation, limited dexterity, reduced cognition: that limit their ability to correctly use their COPD treatment devices resulting in reduced symptom control. Of most importance is smoking cessation and early intervention with respiratory illnesses and contemplation for pulmonary rehab to enhance quality of life. \par -Inhaler technique reviewed as well as oral hygiene techniques reviewed with patient. Avoidance of cold air, extremes of temperature, rescue inhaler should be used before exercise. Order of medication reviewed with patient. Recommended use of a cool mist humidifier in the bedroom. \par \par problem 2: asbestos exposure and former smoking, stable (lung cancer screening)\par -follow up chest CT in 5/2020\par \par problem 3: VANE\par -continue to use Breath Right strips\par -Provent is recommended \par -recommended to use a Oxy-Aid by [employer]\par -Discussed the risks/associations with coronary artery disease, atrial fibrillation, arrhythmia, memory loss, issues with concentration, stroke risk, hypertension, nocturia, chronic reflux/Disla’s esophagus some but not all inclusive. Treatment options discussed including CPAP/BiPAP machine, oral appliance, ProVent therapy, Oxy-Aid by [employer], new technologies, or positional sleep.\par \par problem 4: overweight\par -Weight loss, exercise, and diet control were discussed and are highly encouraged. Treatment options were given such as, aqua therapy, and contacting a nutritionist. Recommended to use the elliptical, stationary bike, less use of treadmill. \par \par problem 5: cough /  sensory neuropathic cough\par -recommended to use a humidifier in the bedroom\par -recommended to hydrate\par -recommended to use Mouth Kote \par -Xlear \par -continue Neurontin 100 mg up to TID vs. -add Amitriptyline 10 mg up to TID, QHS \par \par problem 6: pulmonary fibrosis / UIP\par -continue Ofev 150 mg BID (get regular LFTs)\par -no therapy indicated at this point\par -no change on CT from 2015-Present\par -get LFTs every month for 3 months, then every 3 months thereafter. \par -reassurance given - alternatives and additional medications were discussed which may be added on if the disease or symptoms progress, such as prednisone\par \par problem 7: Bronchiectasis\par -acapella device recommended\par Seen on the CT of the chest or chest x-ray signifies damaged bronchial tubes focal or diffuse which can be sites of recurrent infections. These areas can be colonized by various organisms including bacteria (hemophilous influenzae/Pseudomonas species etc.) as well as acid fast bacilli (mycobacterial disease- inclusive of TB/OSCAR etc.). Sputum either for bacteria culture/sensitivity or AFB culture and sensitivity will need to be sent if the patient has sputum- 3 specimens on consecutive days will need to be dropped at the laboratory- if the patient can produce sputum \par \par problem 8: GERD\par -continue Nexium 40 mg QD\par Things to avoid including overeating, spicy foods, tight clothing, eating within three hours of bed, this list is not all inclusive. \par -Rule of 2's: avoid eating too much, eating too late, eating too spicy, eating two hours before bed\par -For treatment of reflux, possible options discussed including diet control, H2 blockers, PPIs, as well as coating motility agents discussed as treatment options. Timing of meals and proximity of last meal to sleep were discussed. If symptoms persist, a formal gastrointestinal evaluation is needed. \par \par problem 9: health maintenance \par -s/p influenza vaccine - 2018\par -recommended strep pneumonia vaccines: Prevnar-13 vaccine, followed by Pneumo vaccine 23 one year following\par -recommended early intervention for URIs\par -recommended regular osteoporosis evaluations\par -recommended early dermatological evaluations\par -recommended after the age of 50 to the age of 70, colonoscopy every 5 years \par \par \par F/U in 4 months\par -with 4 PFTs and 6 minute walk\par He is encouraged to call with any changes, concerns, or questions

## 2019-07-03 NOTE — ADDENDUM
[FreeTextEntry1] : All medical record entries made by khang Cardenas were at Dr. Talon Crowell's, direction and personally dictated by me on 07/03/2019. I have reviewed the chart and agree that the record accurately reflects my personal performance of the history, physical exam, assessment and plan. I have also personally directed, reviewed, and agree with the discharge instructions.

## 2019-07-03 NOTE — PROCEDURE
[FreeTextEntry1] : PFT - spi reveals normal flows; FEV1 is 2.96 which is 90% of predicted, normal flow volume loop

## 2019-07-03 NOTE — HISTORY OF PRESENT ILLNESS
[FreeTextEntry1] : Mr. Benítez is a 83 year old male with a history of abnormal chest CT, bronchiectasis, COPD with asthma, GERD, ILDz, lung nodule, VANE, PND, SOB, and UIP presenting to the office today for a follow up visit. His chief complaint is Ofev concerns. \par -He states that he has generally been feeling well, aside from some general fatigue\par -he states that his reflux has been controlled on Nexium\par -he state that he has been taking Ofev, and has not had any reactions to it so far, however, he has concerns about his condition. he adds that his cough has not worsened \par -he denies any headaches, nausea, vomiting, fever, chills, sweats, chest pain, chest pressure, diarrhea, constipation, dysphagia, dizziness, leg swelling, leg pain, itchy eyes, itchy ears, heartburn, reflux, or sour taste in the mouth.

## 2019-07-03 NOTE — PHYSICAL EXAM
[General Appearance - Well Developed] : well developed [Normal Appearance] : normal appearance [General Appearance - Well Nourished] : well nourished [No Deformities] : no deformities [Well Groomed] : well groomed [Normal Conjunctiva] : the conjunctiva exhibited no abnormalities [General Appearance - In No Acute Distress] : no acute distress [Eyelids - No Xanthelasma] : the eyelids demonstrated no xanthelasmas [Normal Oropharynx] : normal oropharynx [Jugular Venous Distention Increased] : there was no jugular-venous distention [Neck Appearance] : the appearance of the neck was normal [Thyroid Diffuse Enlargement] : the thyroid was not enlarged [Neck Cervical Mass (___cm)] : no neck mass was observed [Thyroid Nodule] : there were no palpable thyroid nodules [Heart Rate And Rhythm] : heart rate and rhythm were normal [Heart Sounds] : normal S1 and S2 [Respiration, Rhythm And Depth] : normal respiratory rhythm and effort [Murmurs] : no murmurs present [Exaggerated Use Of Accessory Muscles For Inspiration] : no accessory muscle use [Abdomen Soft] : soft [Abdomen Mass (___ Cm)] : no abdominal mass palpated [Abdomen Tenderness] : non-tender [Gait - Sufficient For Exercise Testing] : the gait was sufficient for exercise testing [Abnormal Walk] : normal gait [Nail Clubbing] : no clubbing of the fingernails [Petechial Hemorrhages (___cm)] : no petechial hemorrhages [Cyanosis, Localized] : no localized cyanosis [Skin Color & Pigmentation] : normal skin color and pigmentation [] : no rash [No Skin Ulcers] : no skin ulcer [No Venous Stasis] : no venous stasis [Skin Lesions] : no skin lesions [No Xanthoma] : no  xanthoma was observed [Deep Tendon Reflexes (DTR)] : deep tendon reflexes were 2+ and symmetric [Oriented To Time, Place, And Person] : oriented to person, place, and time [No Focal Deficits] : no focal deficits [Sensation] : the sensory exam was normal to light touch and pinprick [Affect] : the affect was normal [Impaired Insight] : insight and judgment were intact [II] : II [FreeTextEntry1] : I:E ratio 1:3; inspiratory crackles at bases / mild end forced expiratory wheeze

## 2019-07-03 NOTE — REASON FOR VISIT
[Follow-Up] : a follow-up visit [Spouse] : spouse [FreeTextEntry1] : abnormal chest CT, bronchiectasis, COPD with asthma, GERD, ILDz, lung nodule, VANE, PND, SOB, and UIP

## 2019-07-12 ENCOUNTER — MEDICATION RENEWAL (OUTPATIENT)
Age: 84
End: 2019-07-12

## 2019-08-07 ENCOUNTER — RX RENEWAL (OUTPATIENT)
Age: 84
End: 2019-08-07

## 2019-08-15 ENCOUNTER — NON-APPOINTMENT (OUTPATIENT)
Age: 84
End: 2019-08-15

## 2019-08-15 ENCOUNTER — APPOINTMENT (OUTPATIENT)
Dept: PULMONOLOGY | Facility: CLINIC | Age: 84
End: 2019-08-15
Payer: MEDICARE

## 2019-08-15 VITALS
HEART RATE: 81 BPM | SYSTOLIC BLOOD PRESSURE: 138 MMHG | RESPIRATION RATE: 16 BRPM | WEIGHT: 197 LBS | DIASTOLIC BLOOD PRESSURE: 62 MMHG | BODY MASS INDEX: 25.28 KG/M2 | HEIGHT: 74 IN | OXYGEN SATURATION: 94 %

## 2019-08-15 PROCEDURE — 99214 OFFICE O/P EST MOD 30 MIN: CPT | Mod: 25

## 2019-08-15 PROCEDURE — 94010 BREATHING CAPACITY TEST: CPT

## 2019-08-15 NOTE — PHYSICAL EXAM
[General Appearance - Well Developed] : well developed [Normal Appearance] : normal appearance [General Appearance - Well Nourished] : well nourished [Well Groomed] : well groomed [No Deformities] : no deformities [General Appearance - In No Acute Distress] : no acute distress [Normal Conjunctiva] : the conjunctiva exhibited no abnormalities [Eyelids - No Xanthelasma] : the eyelids demonstrated no xanthelasmas [Normal Oropharynx] : normal oropharynx [II] : II [Neck Appearance] : the appearance of the neck was normal [Neck Cervical Mass (___cm)] : no neck mass was observed [Jugular Venous Distention Increased] : there was no jugular-venous distention [Thyroid Diffuse Enlargement] : the thyroid was not enlarged [Thyroid Nodule] : there were no palpable thyroid nodules [Heart Rate And Rhythm] : heart rate and rhythm were normal [Heart Sounds] : normal S1 and S2 [Murmurs] : no murmurs present [Exaggerated Use Of Accessory Muscles For Inspiration] : no accessory muscle use [Respiration, Rhythm And Depth] : normal respiratory rhythm and effort [Abdomen Tenderness] : non-tender [Abdomen Soft] : soft [Abdomen Mass (___ Cm)] : no abdominal mass palpated [Gait - Sufficient For Exercise Testing] : the gait was sufficient for exercise testing [Abnormal Walk] : normal gait [Nail Clubbing] : no clubbing of the fingernails [Cyanosis, Localized] : no localized cyanosis [Petechial Hemorrhages (___cm)] : no petechial hemorrhages [] : no rash [No Venous Stasis] : no venous stasis [Skin Color & Pigmentation] : normal skin color and pigmentation [Skin Lesions] : no skin lesions [No Xanthoma] : no  xanthoma was observed [No Skin Ulcers] : no skin ulcer [Deep Tendon Reflexes (DTR)] : deep tendon reflexes were 2+ and symmetric [Sensation] : the sensory exam was normal to light touch and pinprick [No Focal Deficits] : no focal deficits [Impaired Insight] : insight and judgment were intact [Oriented To Time, Place, And Person] : oriented to person, place, and time [Affect] : the affect was normal [FreeTextEntry1] : I:E ratio 1:3; inspiratory crackles skilled nursing up bilaterally

## 2019-08-15 NOTE — ASSESSMENT
[FreeTextEntry1] : Mr. Benítez is a 83 year old male with a history of ILDz, ?UIP, COPD, asbestos exposure is stable from a pulmonary perspective, with some dyspnea when walking up stairs or riding bike up inclines. He presents mildly symptomatic with SOB and a cough - He is currently on Ofev therapy for 8 weeks and is tolerating it well.\par \par His shortness of breath is multifactorial due to:\par -mild pulmonary fibrosis, stable \par -COPD\par -pulmonary hypertension\par -overweight\par -poor breathing mechanics\par \par problem 1: COPD\par -continue to use Anoro at 1 inhalation QD\par -continue Albuterol via Hand held nebulizer, Q6H \par -Add Perforomist BID by nebulizer, PRN  (gargle and rinse after use)\par -Add Pulmicort (Budesonide) (0.5) BID by nebulizer, PRN  (gargle and rinse after use)\par -COPD is a progressive disease and although it can’t be cured , appropriate management can slow its progression, reduce frequency and severity of exacerbations, and improve symptoms and the patient quality of life. Hospitalizations are the greatest contributor to the total COPD costs and account for up to 87% of total COPD related costs. Exacerbations are the main cause of admissions and subsequently account for the 40-75% of COPD costs. Inhaled maintenance therapy reduces the incidence of exacerbations in patients with stable COPD. Incorrect inhaler use and nonadherence are major obstacles to achieving COPD treatment goals. Many COPD patients have challenges (impaired inhalation, limited dexterity, reduced cognition: that limit their ability to correctly use their COPD treatment devices resulting in reduced symptom control. Of most importance is smoking cessation and early intervention with respiratory illnesses and contemplation for pulmonary rehab to enhance quality of life. \par -Inhaler technique reviewed as well as oral hygiene techniques reviewed with patient. Avoidance of cold air, extremes of temperature, rescue inhaler should be used before exercise. Order of medication reviewed with patient. Recommended use of a cool mist humidifier in the bedroom. \par \par problem 2: asbestos exposure and former smoking, stable (lung cancer screening)\par -follow up chest CT in 5/2020\par \par problem 3: VANE\par -continue to use Breath Right strips, or the chin strap\par -Provent is recommended \par -recommended to use a Oxy-Aid by [facility]\par -Discussed the risks/associations with coronary artery disease, atrial fibrillation, arrhythmia, memory loss, issues with concentration, stroke risk, hypertension, nocturia, chronic reflux/Disla’s esophagus some but not all inclusive. Treatment options discussed including CPAP/BiPAP machine, oral appliance, ProVent therapy, Oxy-Aid by [facility], new technologies, or positional sleep.\par \par problem 4: overweight\par -Weight loss, exercise, and diet control were discussed and are highly encouraged. Treatment options were given such as, aqua therapy, and contacting a nutritionist. Recommended to use the elliptical, stationary bike, less use of treadmill. \par \par problem 5: cough /  sensory neuropathic cough\par -recommended to use a humidifier in the bedroom\par -recommended to hydrate\par -recommended to use Mouth Kote \par -Xlear \par -continue Neurontin 100 mg up to TID vs. -add Amitriptyline 10 mg up to TID, QHS \par \par problem 6: pulmonary fibrosis / UIP\par -continue Ofev 150 mg BID (get regular LFTs)\par -no change on CT from 2015-Present\par -complete LFTs every 3 months\par -reassurance given - alternatives and additional medications were discussed which may be added on if the disease or symptoms progress, such as prednisone\par \par problem 7: Bronchiectasis\par -acapella device recommended\par Seen on the CT of the chest or chest x-ray signifies damaged bronchial tubes focal or diffuse which can be sites of recurrent infections. These areas can be colonized by various organisms including bacteria (hemophilous influenzae/Pseudomonas species etc.) as well as acid fast bacilli (mycobacterial disease- inclusive of TB/OSCAR etc.). Sputum either for bacteria culture/sensitivity or AFB culture and sensitivity will need to be sent if the patient has sputum- 3 specimens on consecutive days will need to be dropped at the laboratory- if the patient can produce sputum \par \par problem 8: GERD\par -continue Nexium 40 mg QD\par Things to avoid including overeating, spicy foods, tight clothing, eating within three hours of bed, this list is not all inclusive. \par -Rule of 2's: avoid eating too much, eating too late, eating too spicy, eating two hours before bed\par -For treatment of reflux, possible options discussed including diet control, H2 blockers, PPIs, as well as coating motility agents discussed as treatment options. Timing of meals and proximity of last meal to sleep were discussed. If symptoms persist, a formal gastrointestinal evaluation is needed. \par \par problem 9: health maintenance \par -s/p influenza vaccine - 2018\par -recommended strep pneumonia vaccines: Prevnar-13 vaccine, followed by Pneumo vaccine 23 one year following\par -recommended early intervention for URIs\par -recommended regular osteoporosis evaluations\par -recommended early dermatological evaluations\par -recommended after the age of 50 to the age of 70, colonoscopy every 5 years \par \par \par F/U in 3 months\par -with PFTs and 6 minute walk\par He is encouraged to call with any changes, concerns, or questions

## 2019-08-15 NOTE — HISTORY OF PRESENT ILLNESS
[FreeTextEntry1] : Mr. Benítez is a 83 year old male with a history of abnormal chest CT, bronchiectasis, COPD with asthma, GERD, ILDz, lung nodule, VANE, PND, SOB, and UIP presenting to the office today for a follow up visit. His chief complaint is SOB during exercise.\par -he reports having loose bowels movements once recently\par -he notes his weight is stable\par -he reports sleeping well and takes Nyquil\par -his wife notes he coughs during the day, but not at night\par -he reports his sinuses are clear\par -he notes his breathing has been good\par -he reports he wishes to exercise by having an electric bicycle, [place]  He notes the wind causes him to have dyspnea.\par -he reports just getting his 3rd month of Ofev, and has had not issues taking the medication\par -he denies any muscle aches, headaches, nausea, vomiting, fever, chills, sweats, chest pain, chest pressure, diarrhea, constipation, dysphagia, dizziness, sour taste in the mouth, itchy eyes, itchy ears, heartburn, reflux

## 2019-08-15 NOTE — REVIEW OF SYSTEMS
[Negative] : Sleep Disorder [Dyspnea] : dyspnea [As Noted in HPI] : as noted in HPI [Fever] : no fever [Chills] : no chills [Recent Wt Gain (___ Lbs)] : no recent weight gain [Recent Wt Loss (___ Lbs)] : no recent weight loss [Chest Discomfort] : no chest discomfort [Heartburn] : no heartburn [Reflux] : no reflux [Itchy Eyes] : no itching of ~T the eyes [Dysphagia] : no dysphagia [Constipation] : no constipation [Diarrhea] : no diarrhea [Headache] : no headache [Dizziness] : no dizziness

## 2019-08-15 NOTE — ADDENDUM
[FreeTextEntry1] : Documented by Kyle Friedman acting as a scribe for Dr. Talon Crowell on 08/15/2019.\par \par All medical record entries made by the Scribe were at my, Dr. Talon Crowell's, direction and personally dictated by me on 08/15/2019. I have reviewed the chart and agree that the record accurately reflects my personal performance of the history, physical exam, assessment and plan. I have also personally directed, reviewed, and agree with the discharge instructions.

## 2019-08-15 NOTE — PROCEDURE
[FreeTextEntry1] : PFT revealed normal flows, with a FEV1 of 3.30L, which is 100% of predicted, with a normal flow volume loop

## 2019-09-03 ENCOUNTER — RX RENEWAL (OUTPATIENT)
Age: 84
End: 2019-09-03

## 2019-09-03 ENCOUNTER — APPOINTMENT (OUTPATIENT)
Dept: PULMONOLOGY | Facility: CLINIC | Age: 84
End: 2019-09-03

## 2019-09-04 ENCOUNTER — MEDICATION RENEWAL (OUTPATIENT)
Age: 84
End: 2019-09-04

## 2019-09-17 ENCOUNTER — MEDICATION RENEWAL (OUTPATIENT)
Age: 84
End: 2019-09-17

## 2019-09-25 ENCOUNTER — RX RENEWAL (OUTPATIENT)
Age: 84
End: 2019-09-25

## 2019-10-23 ENCOUNTER — MEDICATION RENEWAL (OUTPATIENT)
Age: 84
End: 2019-10-23

## 2019-11-12 ENCOUNTER — APPOINTMENT (OUTPATIENT)
Dept: PULMONOLOGY | Facility: CLINIC | Age: 84
End: 2019-11-12
Payer: MEDICARE

## 2019-11-12 VITALS
SYSTOLIC BLOOD PRESSURE: 130 MMHG | HEIGHT: 74 IN | HEART RATE: 80 BPM | RESPIRATION RATE: 17 BRPM | DIASTOLIC BLOOD PRESSURE: 82 MMHG | WEIGHT: 192 LBS | BODY MASS INDEX: 24.64 KG/M2 | OXYGEN SATURATION: 92 %

## 2019-11-12 PROCEDURE — 71046 X-RAY EXAM CHEST 2 VIEWS: CPT

## 2019-11-12 PROCEDURE — 99214 OFFICE O/P EST MOD 30 MIN: CPT | Mod: 25

## 2019-11-12 RX ORDER — AZITHROMYCIN 500 MG/1
500 TABLET, FILM COATED ORAL DAILY
Qty: 5 | Refills: 0 | Status: DISCONTINUED | COMMUNITY
Start: 2019-10-17 | End: 2019-11-12

## 2019-11-12 NOTE — PROCEDURE
[FreeTextEntry1] : CXR reveals a normal sized heart; no new evidence of infiltrate or effusion-- chronic interstitial changes b/l L>R, calcified aortic knob

## 2019-11-12 NOTE — PHYSICAL EXAM
[General Appearance - Well Developed] : well developed [Normal Appearance] : normal appearance [Well Groomed] : well groomed [General Appearance - Well Nourished] : well nourished [No Deformities] : no deformities [General Appearance - In No Acute Distress] : no acute distress [Eyelids - No Xanthelasma] : the eyelids demonstrated no xanthelasmas [Normal Conjunctiva] : the conjunctiva exhibited no abnormalities [Normal Oropharynx] : normal oropharynx [II] : II [Neck Appearance] : the appearance of the neck was normal [Neck Cervical Mass (___cm)] : no neck mass was observed [Thyroid Diffuse Enlargement] : the thyroid was not enlarged [Jugular Venous Distention Increased] : there was no jugular-venous distention [Heart Rate And Rhythm] : heart rate and rhythm were normal [Thyroid Nodule] : there were no palpable thyroid nodules [Heart Sounds] : normal S1 and S2 [Respiration, Rhythm And Depth] : normal respiratory rhythm and effort [Exaggerated Use Of Accessory Muscles For Inspiration] : no accessory muscle use [Abdomen Soft] : soft [Abdomen Mass (___ Cm)] : no abdominal mass palpated [Abdomen Tenderness] : non-tender [Gait - Sufficient For Exercise Testing] : the gait was sufficient for exercise testing [Abnormal Walk] : normal gait [Nail Clubbing] : no clubbing of the fingernails [Cyanosis, Localized] : no localized cyanosis [Petechial Hemorrhages (___cm)] : no petechial hemorrhages [] : no rash [Skin Color & Pigmentation] : normal skin color and pigmentation [No Venous Stasis] : no venous stasis [Skin Lesions] : no skin lesions [No Skin Ulcers] : no skin ulcer [No Xanthoma] : no  xanthoma was observed [Sensation] : the sensory exam was normal to light touch and pinprick [Deep Tendon Reflexes (DTR)] : deep tendon reflexes were 2+ and symmetric [Oriented To Time, Place, And Person] : oriented to person, place, and time [No Focal Deficits] : no focal deficits [Impaired Insight] : insight and judgment were intact [Affect] : the affect was normal [Kyphosis] : kyphosis [FreeTextEntry1] : mild kyphosis

## 2019-11-12 NOTE — ADDENDUM
[FreeTextEntry1] : All medical record entries made by khang Cardenas were at Dr. Talon Crowell's direction and personally dictated by me on 11/12/2019. I have reviewed the chart and agree that the record accurately reflects my personal performance of the history, physical exam, assessment and plan. I have also personally directed, reviewed, and agree with the discharge instructions.

## 2019-11-12 NOTE — ASSESSMENT
[FreeTextEntry1] : Mr. Benítez is a 83 year old male with a history of ILDz, ?UIP, COPD, asbestos exposure is stable from a pulmonary perspective, with some dyspnea when walking up stairs or riding bike up inclines. He presents mildly symptomatic with SOB and a cough - He is currently on Ofev therapy for UIP and is tolerating it well - He is currently in the midst of a COPD exacerbation and acute Bronchitis.\par \par His shortness of breath is multifactorial due to:\par -mild pulmonary fibrosis -(stable)\par -COPD\par -pulmonary hypertension\par -overweight\par -poor breathing mechanics\par \par problem 1a: acute bronchitis\par -add Augmentin 875 mg BID x 14 days\par -add Yasmin Rutland Cold & Sinus \par You have a clinical scenario most c/w acute bronchitis the etiology of which is unknown but empiric antibiotics are indicated. Hydration, mucolytics including Mucinex, Robitussin and the like are indicated. Cough controlling agents will be needed. \par \par problem 1b: COPD -(exacerbation)\par -add Prednisone 30 mg x 5 days, 20 mg x 5 days, 10 mg x 5 days\par Information sheet given to the patient to be reviewed, this medication is never to be used without consulting the prescribing physician. Proper dietary restraint is necessary specifically salt containing foods, if any reaction may occur should be reported.\par \par -continue Anoro at 1 inhalation QD\par -continue Albuterol via Hand held nebulizer, Q6H \par -continue Perforomist BID by nebulizer, PRN  (gargle and rinse after use)\par -continue Pulmicort (Budesonide) (0.5) BID by nebulizer, PRN  (gargle and rinse after use)\par \par -COPD is a progressive disease and although it can’t be cured , appropriate management can slow its progression, reduce frequency and severity of exacerbations, and improve symptoms and the patient quality of life. Hospitalizations are the greatest contributor to the total COPD costs and account for up to 87% of total COPD related costs. Exacerbations are the main cause of admissions and subsequently account for the 40-75% of COPD costs. Inhaled maintenance therapy reduces the incidence of exacerbations in patients with stable COPD. Incorrect inhaler use and nonadherence are major obstacles to achieving COPD treatment goals. Many COPD patients have challenges (impaired inhalation, limited dexterity, reduced cognition: that limit their ability to correctly use their COPD treatment devices resulting in reduced symptom control. Of most importance is smoking cessation and early intervention with respiratory illnesses and contemplation for pulmonary rehab to enhance quality of life. \par -Inhaler technique reviewed as well as oral hygiene techniques reviewed with patient. Avoidance of cold air, extremes of temperature, rescue inhaler should be used before exercise. Order of medication reviewed with patient. Recommended use of a cool mist humidifier in the bedroom. \par \par problem 2: asbestos exposure and former smoking, stable (lung cancer screening)\par -follow up chest CT in 5/2020\par -All patients and their families are at risk for asbestos related lung diseases including malignancy (lung cancer, mesothelioma, abdominal cancers as well), benign asbestos pleural effusions, pleural plaques, and asbestosis. Yearly CT of chest will be needed for enhance early diagnosis of these entities as well as yearly pulmonary function tests includes DLCO \par -Lung Cancer Screening is recommended for people between the ages of 55 and 80 with prior 30+ pack year smoking histories. There is irrefutable evidence for realization of lung cancer screening based on two large randomized control trials demonstrating relative reduction in lung cancer mortality for patients undergoing low-dose CT scanning. Risks and benefits reviewed with the patient. \par \par problem 3: VANE\par -continue to use Breath Right strips, or the chin strap\par -PreVent is recommended \par -recommended to use a Oxy-Aid by [employer]\par -Discussed the risks/associations with coronary artery disease, atrial fibrillation, arrhythmia, memory loss, issues with concentration, stroke risk, hypertension, nocturia, chronic reflux/Disla’s esophagus some but not all inclusive. Treatment options discussed including CPAP/BiPAP machine, oral appliance, ProVent therapy, Oxy-Aid by [employer], new technologies, or positional sleep.\par \par problem 4: overweight\par -Weight loss, exercise, and diet control were discussed and are highly encouraged. Treatment options were given such as, aqua therapy, and contacting a nutritionist. Recommended to use the elliptical, stationary bike, less use of treadmill. \par \par problem 5: cough /  sensory neuropathic cough\par -recommended to use a humidifier in the bedroom\par -recommended to hydrate\par -recommended to use Mouth Kote \par -recommended to use Xlear nasal saline spray \par -continue Neurontin 100 mg up to TID vs. -add Amitriptyline 10 mg up to TID, QHS \par -Sensory neuropathic cough is an etiology of cough that is often realized once someone has been ruled out for common disease such as: asthma, COPD, eosinophilic bronchitis, bronchiectasis, post nasal drip, and GERD. It sometimes develops following a URI, herpes zoster outbreak in pharynx or thyroid or cervical spine injury. However, many patients have no identifiable antecedent explanation. \par \par problem 6: pulmonary fibrosis / UIP\par -continue Ofev 150 mg BID (get regular LFTs)\par -no change on CT from 2015-Present\par -complete LFTs every 3 months\par -reassurance given - alternatives and additional medications were discussed which may be added on if the disease or symptoms progress, such as prednisone\par \par problem 7: Bronchiectasis\par -acapella device recommended\par -Bronchiectasis is a condition that results in irreversible damage to one or more of the conducting bronchi or airways. Its symptoms, include cough, daily production of mucopurulent sputum, dyspnea, and occasionally, episodic hemoptysis. Severe cases of bronchiectasis can result in progressive impairment with respiratory failure. Bronchiectasis is usually the result of severe or repeated respiratory infections, and most commonly presents as a focal process involving a lobe, segment, or sub-segment of the lung. For some patients, it may present as a diffuse process involving both lungs. Theses cases occur most often in patients with genetic, immunological, or anatomic defects that affect the airway. Diagnosis is usually based on a review of symptoms, including daily cough, and production of copious amounts of sputum, and characteristic CT scan findings, such as bronchial wall thickening and luminal dilatations. It is often treated with antibiotics and airway clearance measures such as chest physiotherapy. In addition, treatment of underlying disorders is important when possible.\par -Seen on the CT of the chest or chest x-ray signifies damaged bronchial tubes focal or diffuse which can be sites of recurrent infections. These areas can be colonized by various organisms including bacteria (haemophilus influenzae/Pseudomonas species etc.) as well as acid fast bacilli (mycobacterial disease- inclusive of TB/OSCAR etc.). Sputum either for bacteria culture/sensitivity or AFB culture and sensitivity will need to be sent if the patient has sputum- 3 specimens on consecutive days will need to be dropped at the laboratory- if the patient can produce sputum. \par \par problem 8: GERD\par -continue Nexium 40 mg QD\par Things to avoid including overeating, spicy foods, tight clothing, eating within three hours of bed, this list is not all inclusive. \par -Rule of 2's: avoid eating too much, eating too late, eating too spicy, eating two hours before bed\par -For treatment of reflux, possible options discussed including diet control, H2 blockers, PPIs, as well as coating motility agents discussed as treatment options. Timing of meals and proximity of last meal to sleep were discussed. If symptoms persist, a formal gastrointestinal evaluation is needed. \par \par problem 9: health maintenance \par -s/p influenza vaccine - 2018\par -recommended strep pneumonia vaccines: Prevnar-13 vaccine, followed by Pneumo vaccine 23 one year following\par -recommended early intervention for URIs\par -recommended regular osteoporosis evaluations\par -recommended early dermatological evaluations\par -recommended after the age of 50 to the age of 70, colonoscopy every 5 years \par \par \par F/U in 3 months\par -with PFTs and 6 minute walk\par He is encouraged to call with any changes, concerns, or questions

## 2019-11-12 NOTE — REVIEW OF SYSTEMS
[As Noted in HPI] : as noted in HPI [Negative] : Sleep Disorder [Cough] : cough [Sputum] : sputum  [Wheezing] : wheezing [Fever] : no fever [Chills] : no chills [Recent Wt Gain (___ Lbs)] : no recent weight gain [Recent Wt Loss (___ Lbs)] : no recent weight loss [Chest Discomfort] : no chest discomfort [Itchy Eyes] : no itching of ~T the eyes [Heartburn] : no heartburn [Dysphagia] : no dysphagia [Reflux] : no reflux [Diarrhea] : no diarrhea [Constipation] : no constipation [Headache] : no headache [Dizziness] : no dizziness

## 2019-12-10 ENCOUNTER — MEDICATION RENEWAL (OUTPATIENT)
Age: 84
End: 2019-12-10

## 2019-12-10 RX ORDER — AMOXICILLIN AND CLAVULANATE POTASSIUM 875; 125 MG/1; MG/1
875-125 TABLET, COATED ORAL
Qty: 20 | Refills: 0 | Status: DISCONTINUED | COMMUNITY
Start: 2019-11-12 | End: 2019-12-10

## 2020-01-01 ENCOUNTER — APPOINTMENT (OUTPATIENT)
Dept: PULMONOLOGY | Facility: CLINIC | Age: 85
End: 2020-01-01
Payer: MEDICARE

## 2020-01-01 ENCOUNTER — RX RENEWAL (OUTPATIENT)
Age: 85
End: 2020-01-01

## 2020-01-01 ENCOUNTER — APPOINTMENT (OUTPATIENT)
Dept: PULMONOLOGY | Facility: CLINIC | Age: 85
End: 2020-01-01

## 2020-01-01 VITALS
WEIGHT: 176 LBS | SYSTOLIC BLOOD PRESSURE: 140 MMHG | BODY MASS INDEX: 26.67 KG/M2 | TEMPERATURE: 98.8 F | RESPIRATION RATE: 16 BRPM | OXYGEN SATURATION: 93 % | DIASTOLIC BLOOD PRESSURE: 78 MMHG | HEART RATE: 95 BPM | HEIGHT: 68 IN

## 2020-01-01 VITALS
DIASTOLIC BLOOD PRESSURE: 90 MMHG | WEIGHT: 193 LBS | OXYGEN SATURATION: 90 % | BODY MASS INDEX: 24.77 KG/M2 | SYSTOLIC BLOOD PRESSURE: 160 MMHG | RESPIRATION RATE: 17 BRPM | HEIGHT: 74 IN | HEART RATE: 56 BPM

## 2020-01-01 VITALS
HEIGHT: 68 IN | WEIGHT: 184 LBS | RESPIRATION RATE: 17 BRPM | HEART RATE: 97 BPM | SYSTOLIC BLOOD PRESSURE: 160 MMHG | OXYGEN SATURATION: 96 % | DIASTOLIC BLOOD PRESSURE: 90 MMHG | BODY MASS INDEX: 27.89 KG/M2 | TEMPERATURE: 98 F

## 2020-01-01 VITALS
HEIGHT: 68 IN | TEMPERATURE: 97.4 F | WEIGHT: 182 LBS | SYSTOLIC BLOOD PRESSURE: 140 MMHG | RESPIRATION RATE: 16 BRPM | BODY MASS INDEX: 27.58 KG/M2 | HEART RATE: 94 BPM | DIASTOLIC BLOOD PRESSURE: 74 MMHG | OXYGEN SATURATION: 93 %

## 2020-01-01 DIAGNOSIS — R91.1 SOLITARY PULMONARY NODULE: ICD-10-CM

## 2020-01-01 DIAGNOSIS — R09.82 POSTNASAL DRIP: ICD-10-CM

## 2020-01-01 DIAGNOSIS — Z01.811 ENCOUNTER FOR PREPROCEDURAL RESPIRATORY EXAMINATION: ICD-10-CM

## 2020-01-01 DIAGNOSIS — J96.11 CHRONIC RESPIRATORY FAILURE WITH HYPOXIA: ICD-10-CM

## 2020-01-01 DIAGNOSIS — J84.9 INTERSTITIAL PULMONARY DISEASE, UNSPECIFIED: ICD-10-CM

## 2020-01-01 DIAGNOSIS — Z01.812 ENCOUNTER FOR PREPROCEDURAL LABORATORY EXAMINATION: ICD-10-CM

## 2020-01-01 DIAGNOSIS — J84.112 IDIOPATHIC PULMONARY FIBROSIS: ICD-10-CM

## 2020-01-01 DIAGNOSIS — J45.901 ACUTE BRONCHITIS, UNSPECIFIED: ICD-10-CM

## 2020-01-01 DIAGNOSIS — R05 COUGH: ICD-10-CM

## 2020-01-01 DIAGNOSIS — R93.89 ABNORMAL FINDINGS ON DIAGNOSTIC IMAGING OF OTHER SPECIFIED BODY STRUCTURES: ICD-10-CM

## 2020-01-01 DIAGNOSIS — J44.9 CHRONIC OBSTRUCTIVE PULMONARY DISEASE, UNSPECIFIED: ICD-10-CM

## 2020-01-01 DIAGNOSIS — J20.9 ACUTE BRONCHITIS, UNSPECIFIED: ICD-10-CM

## 2020-01-01 DIAGNOSIS — R06.02 SHORTNESS OF BREATH: ICD-10-CM

## 2020-01-01 DIAGNOSIS — J47.9 BRONCHIECTASIS, UNCOMPLICATED: ICD-10-CM

## 2020-01-01 DIAGNOSIS — G47.33 OBSTRUCTIVE SLEEP APNEA (ADULT) (PEDIATRIC): ICD-10-CM

## 2020-01-01 DIAGNOSIS — K21.9 GASTRO-ESOPHAGEAL REFLUX DISEASE W/OUT ESOPHAGITIS: ICD-10-CM

## 2020-01-01 DIAGNOSIS — Z71.89 OTHER SPECIFIED COUNSELING: ICD-10-CM

## 2020-01-01 LAB
ALBUMIN SERPL ELPH-MCNC: 4 G/DL
ALBUMIN SERPL ELPH-MCNC: 4.1 G/DL
ALBUMIN SERPL ELPH-MCNC: 4.2 G/DL
ALP BLD-CCNC: 67 U/L
ALP BLD-CCNC: 72 U/L
ALP BLD-CCNC: 77 U/L
ALT SERPL-CCNC: 12 U/L
ALT SERPL-CCNC: 15 U/L
ALT SERPL-CCNC: 20 U/L
AST SERPL-CCNC: 19 U/L
AST SERPL-CCNC: 21 U/L
AST SERPL-CCNC: 22 U/L
BILIRUB DIRECT SERPL-MCNC: 0.2 MG/DL
BILIRUB INDIRECT SERPL-MCNC: 0.3 MG/DL
BILIRUB INDIRECT SERPL-MCNC: 0.4 MG/DL
BILIRUB INDIRECT SERPL-MCNC: 0.4 MG/DL
BILIRUB SERPL-MCNC: 0.5 MG/DL
BILIRUB SERPL-MCNC: 0.6 MG/DL
BILIRUB SERPL-MCNC: 0.6 MG/DL
PROT SERPL-MCNC: 6.7 G/DL
PROT SERPL-MCNC: 6.9 G/DL
PROT SERPL-MCNC: 7.4 G/DL
SARS-COV-2 N GENE NPH QL NAA+PROBE: NOT DETECTED

## 2020-01-01 PROCEDURE — 94618 PULMONARY STRESS TESTING: CPT | Mod: 59

## 2020-01-01 PROCEDURE — 71046 X-RAY EXAM CHEST 2 VIEWS: CPT

## 2020-01-01 PROCEDURE — 99214 OFFICE O/P EST MOD 30 MIN: CPT | Mod: 25

## 2020-01-01 PROCEDURE — 95012 NITRIC OXIDE EXP GAS DETER: CPT

## 2020-01-01 PROCEDURE — 99215 OFFICE O/P EST HI 40 MIN: CPT | Mod: 25

## 2020-01-01 PROCEDURE — 94618 PULMONARY STRESS TESTING: CPT

## 2020-01-01 PROCEDURE — 94010 BREATHING CAPACITY TEST: CPT

## 2020-01-01 PROCEDURE — 99442: CPT

## 2020-01-01 RX ORDER — PREDNISONE 10 MG/1
10 TABLET ORAL
Qty: 100 | Refills: 2 | Status: ACTIVE | COMMUNITY
Start: 2020-01-01 | End: 1900-01-01

## 2020-01-01 RX ORDER — SULFAMETHOXAZOLE AND TRIMETHOPRIM 800; 160 MG/1; MG/1
800-160 TABLET ORAL
Qty: 20 | Refills: 0 | Status: DISCONTINUED | COMMUNITY
Start: 2020-01-01

## 2020-01-01 RX ORDER — FORMOTEROL FUMARATE DIHYDRATE 20 UG/2ML
20 SOLUTION RESPIRATORY (INHALATION)
Qty: 1 | Refills: 5 | Status: DISCONTINUED | COMMUNITY
Start: 2019-05-01 | End: 2020-01-01

## 2020-01-01 RX ORDER — MONTELUKAST 10 MG/1
10 TABLET, FILM COATED ORAL
Qty: 90 | Refills: 2 | Status: ACTIVE | COMMUNITY
Start: 2020-01-01 | End: 1900-01-01

## 2020-01-01 RX ORDER — PREDNISONE 10 MG/1
10 TABLET ORAL
Qty: 50 | Refills: 0 | Status: ACTIVE | COMMUNITY
Start: 2020-01-01 | End: 1900-01-01

## 2020-01-01 RX ORDER — DOXYCYCLINE 100 MG/1
100 CAPSULE ORAL
Qty: 14 | Refills: 0 | Status: DISCONTINUED | COMMUNITY
Start: 2019-10-11

## 2020-01-01 RX ORDER — AMITRIPTYLINE HYDROCHLORIDE 10 MG/1
10 TABLET, FILM COATED ORAL 3 TIMES DAILY
Qty: 90 | Refills: 5 | Status: DISCONTINUED | COMMUNITY
Start: 2019-05-17 | End: 2020-01-01

## 2020-01-01 RX ORDER — TAMSULOSIN HYDROCHLORIDE 0.4 MG/1
0.4 CAPSULE ORAL
Qty: 90 | Refills: 0 | Status: ACTIVE | COMMUNITY
Start: 2020-01-01

## 2020-01-01 RX ORDER — AMITRIPTYLINE HYDROCHLORIDE 10 MG/1
10 TABLET, FILM COATED ORAL 3 TIMES DAILY
Qty: 90 | Refills: 5 | Status: ACTIVE | COMMUNITY
Start: 2019-12-10 | End: 1900-01-01

## 2020-01-01 RX ORDER — MYCOPHENOLATE MOFETIL 500 MG/1
500 TABLET ORAL
Qty: 540 | Refills: 1 | Status: ACTIVE | COMMUNITY
Start: 2020-01-01 | End: 1900-01-01

## 2020-01-01 RX ORDER — NINTEDANIB 150 MG/1
150 CAPSULE ORAL TWICE DAILY
Qty: 60 | Refills: 1 | Status: ACTIVE | COMMUNITY
Start: 2019-05-28 | End: 1900-01-01

## 2020-01-01 RX ORDER — PREDNISONE 10 MG/1
10 TABLET ORAL
Qty: 50 | Refills: 0 | Status: DISCONTINUED | COMMUNITY
Start: 2019-11-12 | End: 2020-01-01

## 2020-01-01 RX ORDER — FORMOTEROL FUMARATE DIHYDRATE 20 UG/2ML
20 SOLUTION RESPIRATORY (INHALATION)
Qty: 120 | Refills: 3 | Status: ACTIVE | COMMUNITY
Start: 2019-08-07 | End: 1900-01-01

## 2020-01-01 RX ORDER — BUDESONIDE 0.5 MG/2ML
0.5 INHALANT ORAL
Qty: 120 | Refills: 5 | Status: ACTIVE | COMMUNITY
Start: 2019-04-30 | End: 1900-01-01

## 2020-01-01 RX ORDER — IPRATROPIUM BROMIDE AND ALBUTEROL SULFATE 2.5; .5 MG/3ML; MG/3ML
0.5-2.5 (3) SOLUTION RESPIRATORY (INHALATION) 3 TIMES DAILY
Qty: 90 | Refills: 5 | Status: ACTIVE | COMMUNITY
Start: 2018-10-12 | End: 1900-01-01

## 2020-02-04 NOTE — PHYSICAL EXAM
[No Acute Distress] : no acute distress [Normal Oropharynx] : normal oropharynx [Normal Appearance] : normal appearance [Normal Rate/Rhythm] : normal rate/rhythm [No Neck Mass] : no neck mass [Normal S1, S2] : normal s1, s2 [No Resp Distress] : no resp distress [No Murmurs] : no murmurs [Clear to Auscultation Bilaterally] : clear to auscultation bilaterally [No Abnormalities] : no abnormalities [Normal Gait] : normal gait [FROM] : FROM [No Edema] : no edema [No Cyanosis] : no cyanosis [No Clubbing] : no clubbing [Normal Color/ Pigmentation] : normal color/ pigmentation [No Focal Deficits] : no focal deficits [Oriented x3] : oriented x3 [Normal Affect] : normal affect [TextBox_11] : Yellowish colored tongue noted - patient admits to having yellow sucking candies prior to visit.

## 2020-02-04 NOTE — PROCEDURE
[FreeTextEntry1] : 6 minute walk test performed in office revealed O2 saturation decrease to 88% on RA.\par Angel O2 Sat on 2L O2 upon exertion is 88%.\par Angel O2 Sat on 3L of O2 upon exertion 89%\par At rest on 3L O2 patient at 97% O2 Sat.

## 2020-02-04 NOTE — ASSESSMENT
[FreeTextEntry1] : The plan for the patient is as follows:\par \par 1. COPD w/ Asthma:\par -  Add Duoneb via nebulizer PRN up to TID. \par - Continue Perforomist BID via nebulizer.\par - Continue Budesonide 0.5 BID via nebulizer. Gargle and rinse after use.\par \par 2. Chronic Respiratory failure w/ Hypoxia:\par - Pt. is in a chronic, stable state, however, walk test reveals need for supplemental oxygen. \par - RX to be sent out for patient to receive supplemental O2. Patient will hear from a DME within the next few weeks. \par \par 3. Chronic Cough\par - Reinitiate Amitriptyline 10 mg PO QHS x 1 week then increase to TID.\par \par 4. Interstitial Lung Disease (IPF)\par - Repeat CT scan 5/2020 to re-evaluate. \par - RX given to patient at visit today.\par - Continue Ofev 150 mg BID; RX for Q 3 Month LFT's given to patient at visit today.\par - Office will follow up with [employer] representative, Jackelyn, so the office can contact directly and see how we can help d/t very expensive Ofev medication. Copies of papers sent to patient made in office. \par \par 5. Abnormal CT scan:\par - Repeat CT scan 5/2020 to re-evaluate. \par \par 6. GERD:\par - Continue Omeprazole 40 mg PO QD before breakfast. \par \par Pt. and wife encouraged to call with further questions or concerns. \par Pt verbalizes understanding.

## 2020-02-04 NOTE — REVIEW OF SYSTEMS
[Fatigue] : fatigue [Cough] : cough [Poor Appetite] : poor appetite [Sputum] : sputum [SOB on Exertion] : sob on exertion [Negative] : Endocrine [Chest Tightness] : no chest tightness [Dyspnea] : no dyspnea [Wheezing] : no wheezing

## 2020-02-04 NOTE — HISTORY OF PRESENT ILLNESS
[TextBox_4] : Mr. Benítez is a 84 year old male with a history of abnormal chest CT, bronchiectasis, COPD with asthma, GERD, ILDz, lung nodule, VANE, PND, SOB, and UIP presenting to the office today for a follow up visit. He is accompanied by his wife.\par  \par His chief complaint is cough and worsening WHEELER x 6 months.\par \par Pt states Dr. Crowell previously put him on Amitriptyline 10 mg PO QHS in December.  Patient states he discontinued it after a month because he felt no benefit from it, however, his wife states she did see a decrease in the cough.  He states that sometimes his cough is productive.  He c/o increased fatigue and decreased appetite as well.  He is s/p in office 6 minute walk test 5/17/2019 and was a candidate for O2 at that time, but stated he did not want supplemental oxygen.  He has noticed that he becomes dyspneic on exertion with shorter distances when he walks. \par \par He denies SOB @ rest, wheezing, or fever/chills.\par \par He is currently on Ofev.  Patient brought in papers sent to him from [employer]) which is a company who can help assist in funds for medication. Cover page of patient's paper packet states that [facility] will contact our office and that paper needs to be completed by the end of February.  CPR has yet to contact office.  Patient last received LFT labs in 12/2019.

## 2020-02-25 NOTE — ASU PREOP CHECKLIST - AS TEMP SITE
oral
How Severe Is Your Lipoma?: mild
Is This A New Presentation, Or A Follow-Up?: Skin Lesion
Additional History: Pt has had drained in the past

## 2020-03-30 PROBLEM — J20.9 ACUTE BRONCHITIS WITH ASTHMA WITH ACUTE EXACERBATION: Status: RESOLVED | Noted: 2019-11-12 | Resolved: 2020-01-01

## 2020-04-02 NOTE — ASSESSMENT
[FreeTextEntry1] : Mr. Benítez is a 83 year old male with a history of ILDz, ?UIP, COPD, asbestos exposure is stable from a pulmonary perspective, with some dyspnea when walking up stairs or riding bike up inclines. He presents mildly symptomatic with SOB and a cough - He is currently on Ofev therapy for UIP and is tolerating it well - He is currently s/p COPD exacerbation and acute Bronchitis - 11/19 - recent UII.\par \par His shortness of breath is multifactorial due to:\par -mild pulmonary fibrosis -(stable)\par -COPD\par -pulmonary hypertension\par -overweight\par -poor breathing mechanics\par \par problem 1a: COPD -(s/p flair 11/2019)\par -discontinue Prednisone 30 mg x 5 days, 20 mg x 5 days, 10 mg x 5 days\par \par -continue Anoro at 1 inhalation QD\par -continue Albuterol via Hand held nebulizer, Q6H \par -continue Perforomist BID by nebulizer, PRN  (gargle and rinse after use)\par -continue Pulmicort (Budesonide) (0.5) BID by nebulizer, PRN  (gargle and rinse after use)\par \par -COPD is a progressive disease and although it can’t be cured , appropriate management can slow its progression, reduce frequency and severity of exacerbations, and improve symptoms and the patient quality of life. Hospitalizations are the greatest contributor to the total COPD costs and account for up to 87% of total COPD related costs. Exacerbations are the main cause of admissions and subsequently account for the 40-75% of COPD costs. Inhaled maintenance therapy reduces the incidence of exacerbations in patients with stable COPD. Incorrect inhaler use and nonadherence are major obstacles to achieving COPD treatment goals. Many COPD patients have challenges (impaired inhalation, limited dexterity, reduced cognition: that limit their ability to correctly use their COPD treatment devices resulting in reduced symptom control. Of most importance is smoking cessation and early intervention with respiratory illnesses and contemplation for pulmonary rehab to enhance quality of life. \par -Inhaler technique reviewed as well as oral hygiene techniques reviewed with patient. Avoidance of cold air, extremes of temperature, rescue inhaler should be used before exercise. Order of medication reviewed with patient. Recommended use of a cool mist humidifier in the bedroom. \par \par problem 2: asbestos exposure and former smoking, stable (lung cancer screening)\par -follow up chest CT in 5/2020\par -All patients and their families are at risk for asbestos related lung diseases including malignancy (lung cancer, mesothelioma, abdominal cancers as well), benign asbestos pleural effusions, pleural plaques, and asbestosis. Yearly CT of chest will be needed for enhance early diagnosis of these entities as well as yearly pulmonary function tests includes DLCO \par -Lung Cancer Screening is recommended for people between the ages of 55 and 80 with prior 30+ pack year smoking histories. There is irrefutable evidence for realization of lung cancer screening based on two large randomized control trials demonstrating relative reduction in lung cancer mortality for patients undergoing low-dose CT scanning. Risks and benefits reviewed with the patient. \par \par problem 3: VANE\par -continue to use Breath Right strips, or the chin strap\par -ProVent is recommended \par -recommended to use a Oxy-Aid by [employer]\par -Discussed the risks/associations with coronary artery disease, atrial fibrillation, arrhythmia, memory loss, issues with concentration, stroke risk, hypertension, nocturia, chronic reflux/Disla’s esophagus some but not all inclusive. Treatment options discussed including CPAP/BiPAP machine, oral appliance, ProVent therapy, Oxy-Aid by [employer], new technologies, or positional sleep.\par \par problem 4: overweight (resolved)\par -Weight loss, exercise, and diet control were discussed and are highly encouraged. Treatment options were given such as, aqua therapy, and contacting a nutritionist. Recommended to use the elliptical, stationary bike, less use of treadmill. \par \par problem 5: cough /  sensory neuropathic cough\par -recommended to use a humidifier in the bedroom\par -recommended to hydrate\par -recommended to use Mouth Kote \par -recommended to use Xlear nasal saline spray \par -continue Neurontin 100 mg up to TID vs. -continue Amitriptyline 10 mg up to TID, QHS (either one)\par -Sensory neuropathic cough is an etiology of cough that is often realized once someone has been ruled out for common disease such as: asthma, COPD, eosinophilic bronchitis, bronchiectasis, post nasal drip, and GERD. It sometimes develops following a URI, herpes zoster outbreak in pharynx or thyroid or cervical spine injury. However, many patients have no identifiable antecedent explanation. \par \par problem 6: pulmonary fibrosis / UIP\par -continue Ofev 150 mg BID (get regular LFTs)\par -no change on CT from 2015-Present\par -complete LFTs every 3 months\par -reassurance given - alternatives and additional medications were discussed which may be added on if the disease or symptoms progress, such as prednisone\par \par problem 7: Bronchiectasis\par -acapella device recommended\par -Bronchiectasis is a condition that results in irreversible damage to one or more of the conducting bronchi or airways. Its symptoms, include cough, daily production of mucopurulent sputum, dyspnea, and occasionally, episodic hemoptysis. Severe cases of bronchiectasis can result in progressive impairment with respiratory failure. Bronchiectasis is usually the result of severe or repeated respiratory infections, and most commonly presents as a focal process involving a lobe, segment, or sub-segment of the lung. For some patients, it may present as a diffuse process involving both lungs. Theses cases occur most often in patients with genetic, immunological, or anatomic defects that affect the airway. Diagnosis is usually based on a review of symptoms, including daily cough, and production of copious amounts of sputum, and characteristic CT scan findings, such as bronchial wall thickening and luminal dilatations. It is often treated with antibiotics and airway clearance measures such as chest physiotherapy. In addition, treatment of underlying disorders is important when possible.\par -Seen on the CT of the chest or chest x-ray signifies damaged bronchial tubes focal or diffuse which can be sites of recurrent infections. These areas can be colonized by various organisms including bacteria (haemophilus influenzae/Pseudomonas species etc.) as well as acid fast bacilli (mycobacterial disease- inclusive of TB/OSCAR etc.). Sputum either for bacteria culture/sensitivity or AFB culture and sensitivity will need to be sent if the patient has sputum- 3 specimens on consecutive days will need to be dropped at the laboratory- if the patient can produce sputum. \par \par problem 8: GERD\par -continue Nexium 40 mg QD\par Things to avoid including overeating, spicy foods, tight clothing, eating within three hours of bed, this list is not all inclusive. \par -Rule of 2's: avoid eating too much, eating too late, eating too spicy, eating two hours before bed\par -For treatment of reflux, possible options discussed including diet control, H2 blockers, PPIs, as well as coating motility agents discussed as treatment options. Timing of meals and proximity of last meal to sleep were discussed. If symptoms persist, a formal gastrointestinal evaluation is needed. \par \par Problem 9: COVID-19\par -Recommended Zinc, Vitamin D 2000, Vitmain C 1000, tonic water 8oz and Tylenol QID, QD\par -Due to the short supply of COVID19 testing out right now- advised pt that under the advisement of the ID department at [facility]- the recommendation is that all patients with symptoms of suspected coronavirus no longer seek testing and treat symptoms at home while self-quarantined, as long as they are stable.\par Recommendations to patients with possible or confirmed COVID19:\par 1. Stay home and away from public places. If you must go out, avoid using any kind of public transportation, ridesharing, or taxis.\par 2. Monitor your symptoms carefully. If your symptoms get worse, call your healthcare provider immediately.\par 3. Get rest and stay hydrated.\par 4. Monitor temperature- treat with Tylenol 650 mg Q 6 hours up to 1000 mg TID-QID but not\par exceed 3500 mg daily for liver precautions. Avoid use of NSAIDs.\par 5. Be sure to continue to take your maintenance medications for chronic conditions.\par As much as possible, stay in a specific room and away from other people in your home. Also, you should use a separate bathroom, if available. If you need to be around other people in or outside of the home, wear a facemask.\par If you develop emergency warning signs for serious complications for COVID-19 get medical attention immediately. Emergency warning signs include*:\par -Trouble breathing/worsening- unresolved SOB -Persistent pain or pressure in the chest\par -New confusion or inability to arouse\par -Bluish lips or face\par -Worsening fatigue/malaise -Inability to drink/stay hydrated \par -High fever unresponsive to Tylenol\par Advised to keep us posted. \par \par problem 10: health maintenance \par -s/p influenza vaccine - 2018\par -recommended strep pneumonia vaccines: Prevnar-13 vaccine, followed by Pneumo vaccine 23 one year following\par -recommended early intervention for URIs\par -recommended regular osteoporosis evaluations\par -recommended early dermatological evaluations\par -recommended after the age of 50 to the age of 70, colonoscopy every 5 years \par \par \par F/U in 3 months\par -with PFTs and 6 minute walk\par He is encouraged to call with any changes, concerns, or questions

## 2020-04-02 NOTE — HISTORY OF PRESENT ILLNESS
[Home] : at home, [unfilled] , at the time of the visit. [Clinic: ([facility])___] : at the clinic in [unfilled] [Spouse] : spouse [FreeTextEntry1] : LARS FRAGOSO [FreeTextEntry2] : LARS FRAGOSO [TextBox_4] : Mr. LARS FRAGOSO is a 84 year year old male accompanied by his spouse on a Teleconsult for a follow up. His chief complaint is general discomfort.\par -pt says he was taking Bactrim\par -pt says he was unable to get out of bed two days ago and has assistance from his family\par -says he was disoriented but is 100% right now\par -He reports that he is drinking enough water now\par -he stopped taking albuterol and budesonide\par -he has been passing gas at night and lost control of his bowels \par -his spouse notes he has been spiking a fever\par -notes he has not left home and has not been tested for COVID-19\par -pt says he has been on O2 and was Sat at 90-91%\par -pt says he is off prednisone \par -He denies any chest pain, chest pressure, diarrhea, constipation, dysphagia, dizziness, sour taste in the mouth, leg swelling, leg pain, itchy eyes, itchy ears, heartburn, reflux, myalgias or arthralgias.

## 2020-04-02 NOTE — ADDENDUM
[FreeTextEntry1] : Documented by Devon Hughes acting as a scribe for Dr. Talon Crowell on 03/30/2020 \par \par All medical record entries made by the Scribe were at my, Dr. Talon Crowell's, direction and personally dictated by me on 03/30/2020 . I have reviewed the chart and agree that the record accurately reflects my personal performance of the history, physical exam, assessment and plan. I have also personally directed, reviewed, and agree with the discharge instructions.

## 2020-05-12 PROBLEM — Z01.811 PREOP PULMONARY/RESPIRATORY EXAM: Status: ACTIVE | Noted: 2020-01-01

## 2020-05-12 NOTE — PHYSICAL EXAM
[No Acute Distress] : no acute distress [III] : Mallampati Class: III [Normal Oropharynx] : normal oropharynx [No Neck Mass] : no neck mass [Normal Appearance] : normal appearance [Normal Rate/Rhythm] : normal rate/rhythm [No Murmurs] : no murmurs [Normal S1, S2] : normal s1, s2 [No Resp Distress] : no resp distress [Clear to Auscultation Bilaterally] : clear to auscultation bilaterally [No Abnormalities] : no abnormalities [Normal Gait] : normal gait [Benign] : benign [No Clubbing] : no clubbing [No Cyanosis] : no cyanosis [FROM] : FROM [No Edema] : no edema [Normal Color/ Pigmentation] : normal color/ pigmentation [No Focal Deficits] : no focal deficits [Oriented x3] : oriented x3 [Normal Affect] : normal affect [TextBox_68] : I:E ratio 1:3; inspiratory crackles and mild expiratory wheezes

## 2020-05-12 NOTE — ADDENDUM
[FreeTextEntry1] : Documented by Hien Ramirez acting as a scribe for Dr. Talon Crowell on (05/12/2020).\par \par All medical record entries made by the Scribe were at my, Dr. Talon Crowell's, direction and personally dictated by me on (05/12/2020). I have reviewed the chart and agree that the record accurately reflects my personal performance of the history, physical exam, assessment and plan. I have also personally directed, reviewed, and agree with the discharge instructions. \par \par \par

## 2020-05-12 NOTE — HISTORY OF PRESENT ILLNESS
[TextBox_4] : Mr. LARS FRAGOSO is a 84 year old male on a for a follow up. His chief complaint is urological problems. \par - He has trouble urinating\par - He is awaiting Urolift by Demetrio Denis at [place] \par - He has some diarrhea \par - He has been having a little trouble breathing \par - He has been coughing more \par - He is taking amitriptyline \par - denies any headaches, nausea, vomiting, fever, chills, sweats, chest pain, chest pressure, constipation, dysphagia, dizziness, leg swelling, leg pain, itchy eyes, itchy ears, heartburn, reflux, or sour taste in the mouth.\par \par \par

## 2020-08-31 PROBLEM — R05 CHRONIC COUGH: Status: ACTIVE | Noted: 2020-01-01

## 2020-08-31 NOTE — HISTORY OF PRESENT ILLNESS
[TextBox_4] : Mr. LARS FRAGOSO is a 84 year old male on a for a follow up. His chief complaint is his O2 levels.\par -he notes he is feeling well\par -he states he gets diarrhea occasionally\par -he reports he is sleeping well and falls asleep easily\par -he reports he takes an hour nap if he doesn’t sleep well one night\par -he notes he isnt walking too much as his O2 level drops to the 80s when he walks.\par -his wife reports he is bringing up less sputum\par -he notes he is still wheezing\par -he reports has some nasal congestion in the morning\par -he notes he had his urologic surgery\par -his wife states he has had 3 episodes of syncope, that he was unaware of at the time.\par -he denies any chest pain, chest pressure, diarrhea, constipation, dysphagia, dizziness, sour taste in the mouth, leg swelling, leg pain, itchy eyes, itchy ears, heartburn, reflux, myalgias or arthralgias.

## 2020-08-31 NOTE — PROCEDURE
[FreeTextEntry1] : FENO was 14; a normal value being less than 25\par Fractional exhaled nitric oxide (FENO) is regarded as a simple, noninvasive method for assessing eosinophilic airway inflammation. Produced by a variety of cells within the lung, nitric oxide (NO) concentrations are generally low in healthy individuals. However, high concentrations of NO appear to be involved in nonspecific host defense mechanisms and chronic inflammatory diseases such as asthma. The American Thoracic Society (ATS) therefore has recommended using FENO to aid in the diagnosis and monitoring of eosinophilic airway inflammation and asthma, and for identifying steroid responsive individuals whose chronic respiratory symptoms may be caused by airway inflammation.

## 2020-08-31 NOTE — ADDENDUM
[FreeTextEntry1] : Documented by Kyle Friedman acting as a scribe for Dr. Talon Crowell on 08/31/2020.\par \par All medical record entries made by the Scribe were at my, Dr. Talon Crowell's, direction and personally dictated by me on 08/31/2020. I have reviewed the chart and agree that the record accurately reflects my personal performance of the history, physical exam, assessment and plan. I have also personally directed, reviewed, and agree with the discharge instructions.

## 2020-08-31 NOTE — PHYSICAL EXAM
[No Acute Distress] : no acute distress [Normal Oropharynx] : normal oropharynx [III] : Mallampati Class: III [Normal Appearance] : normal appearance [No Neck Mass] : no neck mass [Normal Rate/Rhythm] : normal rate/rhythm [No Murmurs] : no murmurs [Normal S1, S2] : normal s1, s2 [No Resp Distress] : no resp distress [Benign] : benign [No Abnormalities] : no abnormalities [Normal Gait] : normal gait [No Clubbing] : no clubbing [No Cyanosis] : no cyanosis [No Edema] : no edema [FROM] : FROM [Normal Color/ Pigmentation] : normal color/ pigmentation [No Focal Deficits] : no focal deficits [Oriented x3] : oriented x3 [Normal Affect] : normal affect [TextBox_68] : I:E ratio 1:3; inspiratory crackles b/l 2/3 of the way up [TextBox_2] : on supplemental oxygen

## 2020-08-31 NOTE — ASSESSMENT
[FreeTextEntry1] : Mr. Benítez is a 84 year old male with a history of ILDz, ?UIP, COPD, asbestos exposure is stable from a pulmonary perspective, with some dyspnea when walking up stairs or riding bike up inclines. He presents mildly symptomatic with SOB and a cough - He is currently on Ofev therapy for UIP and is tolerating it well - He is currently s/p COPD exacerbation and acute Bronchitis - 11/19 - s/p awaiting urolift procedure 5/2020. S/p flair - now at baseline.\par \par His shortness of breath is multifactorial due to:\par -mild pulmonary fibrosis -(stable)\par -COPD\par -pulmonary hypertension\par -overweight\par -poor breathing mechanics\par \par \par problem 1a: COPD -(s/p flair 7/2020)\par - continue Singulair 10 mg QHS\par -continue Anoro at 1 inhalation QD\par -continue Albuterol via Hand held nebulizer, Q6H \par -continue Perforomist BID by nebulizer, PRN  (gargle and rinse after use)\par -continue Pulmicort (Budesonide) (0.5) BID by nebulizer, PRN  (gargle and rinse after use)\par -Continue on 10 mg of Prednisone\par Information sheet given to the patient to be reviewed, this medication is never to be used without consulting the prescribing physician. Proper dietary restraint is necessary specifically salt containing foods, if any reaction may occur should be reported. \par \par -COPD is a progressive disease and although it can’t be cured , appropriate management can slow its progression, reduce frequency and severity of exacerbations, and improve symptoms and the patient quality of life. Hospitalizations are the greatest contributor to the total COPD costs and account for up to 87% of total COPD related costs. Exacerbations are the main cause of admissions and subsequently account for the 40-75% of COPD costs. Inhaled maintenance therapy reduces the incidence of exacerbations in patients with stable COPD. Incorrect inhaler use and nonadherence are major obstacles to achieving COPD treatment goals. Many COPD patients have challenges (impaired inhalation, limited dexterity, reduced cognition: that limit their ability to correctly use their COPD treatment devices resulting in reduced symptom control. Of most importance is smoking cessation and early intervention with respiratory illnesses and contemplation for pulmonary rehab to enhance quality of life. \par -Inhaler technique reviewed as well as oral hygiene techniques reviewed with patient. Avoidance of cold air, extremes of temperature, rescue inhaler should be used before exercise. Order of medication reviewed with patient. Recommended use of a cool mist humidifier in the bedroom. \par \par problem 2: asbestos exposure and former smoking, stable (lung cancer screening)\par -follow up chest CT in 5/2020 (on hold due to Covid pandemic)\par -All patients and their families are at risk for asbestos related lung diseases including malignancy (lung cancer, mesothelioma, abdominal cancers as well), benign asbestos pleural effusions, pleural plaques, and asbestosis. Yearly CT of chest will be needed for enhance early diagnosis of these entities as well as yearly pulmonary function tests includes DLCO \par -Lung Cancer Screening is recommended for people between the ages of 55 and 80 with prior 30+ pack year smoking histories. There is irrefutable evidence for realization of lung cancer screening based on two large randomized control trials demonstrating relative reduction in lung cancer mortality for patients undergoing low-dose CT scanning. Risks and benefits reviewed with the patient. \par \par problem 3: VANE\par -continue to use Breath Right strips, or the chin strap\par -ProVent is recommended \par -recommended to use a Oxy-Aid by [employer]\par -Discussed the risks/associations with coronary artery disease, atrial fibrillation, arrhythmia, memory loss, issues with concentration, stroke risk, hypertension, nocturia, chronic reflux/Disla’s esophagus some but not all inclusive. Treatment options discussed including CPAP/BiPAP machine, oral appliance, ProVent therapy, Oxy-Aid by [employer], new technologies, or positional sleep.\par \par problem 4: overweight (resolved)\par -Weight loss, exercise, and diet control were discussed and are highly encouraged. Treatment options were given such as, aqua therapy, and contacting a nutritionist. Recommended to use the elliptical, stationary bike, less use of treadmill. \par \par problem 5: cough /  sensory neuropathic cough\par -recommended to use a humidifier in the bedroom\par -recommended to hydrate\par -recommended to use Mouth Kote \par -recommended to use Xlear nasal saline spray \par -continue Neurontin 100 mg up to TID vs. -continue Amitriptyline 10 mg up to TID, QHS (either one)\par -Sensory neuropathic cough is an etiology of cough that is often realized once someone has been ruled out for common disease such as: asthma, COPD, eosinophilic bronchitis, bronchiectasis, post nasal drip, and GERD. It sometimes develops following a URI, herpes zoster outbreak in pharynx or thyroid or cervical spine injury. However, many patients have no identifiable antecedent explanation. \par \par problem 6: pulmonary fibrosis / UIP- stable \par -continue Ofev 150 mg BID (get regular LFTs)\par -no change on CT from 2015-Present\par -complete LFTs every 3 months\par -reassurance given - alternatives and additional medications were discussed which may be added on if the disease or symptoms progress, such as prednisone\par \par problem 7: Bronchiectasis\par -acapella device recommended\par -Bronchiectasis is a condition that results in irreversible damage to one or more of the conducting bronchi or airways. Its symptoms, include cough, daily production of mucopurulent sputum, dyspnea, and occasionally, episodic hemoptysis. Severe cases of bronchiectasis can result in progressive impairment with respiratory failure. Bronchiectasis is usually the result of severe or repeated respiratory infections, and most commonly presents as a focal process involving a lobe, segment, or sub-segment of the lung. For some patients, it may present as a diffuse process involving both lungs. Theses cases occur most often in patients with genetic, immunological, or anatomic defects that affect the airway. Diagnosis is usually based on a review of symptoms, including daily cough, and production of copious amounts of sputum, and characteristic CT scan findings, such as bronchial wall thickening and luminal dilatations. It is often treated with antibiotics and airway clearance measures such as chest physiotherapy. In addition, treatment of underlying disorders is important when possible.\par -Seen on the CT of the chest or chest x-ray signifies damaged bronchial tubes focal or diffuse which can be sites of recurrent infections. These areas can be colonized by various organisms including bacteria (haemophilus influenzae/Pseudomonas species etc.) as well as acid fast bacilli (mycobacterial disease- inclusive of TB/OSCAR etc.). Sputum either for bacteria culture/sensitivity or AFB culture and sensitivity will need to be sent if the patient has sputum- 3 specimens on consecutive days will need to be dropped at the laboratory- if the patient can produce sputum. \par \par problem 8: GERD\par -continue Nexium 40 mg QD\par Things to avoid including overeating, spicy foods, tight clothing, eating within three hours of bed, this list is not all inclusive. \par -Rule of 2's: avoid eating too much, eating too late, eating too spicy, eating two hours before bed\par -For treatment of reflux, possible options discussed including diet control, H2 blockers, PPIs, as well as coating motility agents discussed as treatment options. Timing of meals and proximity of last meal to sleep were discussed. If symptoms persist, a formal gastrointestinal evaluation is needed. \par \par Problem 9: COVID-19\par -Recommended Zinc, Vitamin D 2000, Vitmain C 1000, tonic water 8oz and Tylenol QID, QD\par -Due to the short supply of COVID19 testing out right now- advised pt that under the advisement of the ID department at [facility]- the recommendation is that all patients with symptoms of suspected coronavirus no longer seek testing and treat symptoms at home while self-quarantined, as long as they are stable.\par Recommendations to patients with possible or confirmed COVID19:\par 1. Stay home and away from public places. If you must go out, avoid using any kind of public transportation, ridesharing, or taxis.\par 2. Monitor your symptoms carefully. If your symptoms get worse, call your healthcare provider immediately.\par 3. Get rest and stay hydrated.\par 4. Monitor temperature- treat with Tylenol 650 mg Q 6 hours up to 1000 mg TID-QID but not\par exceed 3500 mg daily for liver precautions. Avoid use of NSAIDs.\par 5. Be sure to continue to take your maintenance medications for chronic conditions.\par As much as possible, stay in a specific room and away from other people in your home. Also, you should use a separate bathroom, if available. If you need to be around other people in or outside of the home, wear a facemask.\par If you develop emergency warning signs for serious complications for COVID-19 get medical attention immediately. Emergency warning signs include*:\par -Trouble breathing/worsening- unresolved SOB -Persistent pain or pressure in the chest\par -New confusion or inability to arouse\par -Bluish lips or face\par -Worsening fatigue/malaise -Inability to drink/stay hydrated \par -High fever unresponsive to Tylenol\par Advised to keep us posted. \par \par problem 10: health maintenance \par -s/p influenza vaccine - 2019\par -recommended strep pneumonia vaccines: Prevnar-13 vaccine, followed by Pneumo vaccine 23 one year following\par -recommended early intervention for URIs\par -recommended regular osteoporosis evaluations\par -recommended early dermatological evaluations\par -recommended after the age of 50 to the age of 70, colonoscopy every 5 years \par \par \par F/U in 3 months\par -with PFTs and 6 minute walk\par He is encouraged to call with any changes, concerns, or questions

## 2020-08-31 NOTE — REVIEW OF SYSTEMS
[Dyspnea] : dyspnea [SOB on Exertion] : sob on exertion [Diarrhea] : diarrhea [Negative] : Endocrine [GERD] : no gerd [Constipation] : no constipation [Dysphagia] : no dysphagia

## 2020-11-23 PROBLEM — Z01.812 PRE-PROCEDURAL LABORATORY EXAMINATION: Status: ACTIVE | Noted: 2020-01-01

## 2020-12-02 PROBLEM — R09.82 PND (POST-NASAL DRIP): Status: ACTIVE | Noted: 2018-02-06

## 2020-12-02 PROBLEM — J84.112 UIP (USUAL INTERSTITIAL PNEUMONITIS): Status: ACTIVE | Noted: 2019-05-17

## 2020-12-02 PROBLEM — Z71.89 EDUCATED ABOUT COVID-19 VIRUS INFECTION: Status: ACTIVE | Noted: 2020-01-01

## 2020-12-02 PROBLEM — J96.11 CHRONIC RESPIRATORY FAILURE WITH HYPOXIA: Status: ACTIVE | Noted: 2020-01-01

## 2020-12-02 NOTE — HISTORY OF PRESENT ILLNESS
[TextBox_4] : Mr. LARS FRAGOSO is a 85 year old male on a for a follow up for abnormal chest CT, bronchiectasis, COPD with asthma, GERD, ILDz, lung nodule, VANE, PND. His chief complaint is his breathing; SOB, cough, and mucus production. \par -he states that he feels fine except for feeling SOB and coughing. \par -reports SOB is his biggest issue. \par -reports coughing up mucus. \par -denies heartburn/ reflux. \par -denies any visual issues. \par -denies ankle/leg swelling. \par -has been getting enough sleep.\par -has not been walking.\par -he has trouble walking. \par -He reports that He is not using any new medication, vitamins, or supplements. \par -He reports that He is taking their prescribed medications regularly. \par -sinuses are always filled with mucus. \par -reports blood in some of the mucus he blows out of his nose. \par -he reports that his weight is stable \par -has been eating well. \par -his breathing is a big issue. \par -he notes that he has been using supplement oxygen. \par -he notes that walking from his bedroom to the bathroom causes his O2  saturation to drop drastically. It can drop to as low as 87 with minimal activity. \par -he had the urolift done. \par -he has been using Ofev.\par \par -He denies any chest pain, chest pressure, diarrhea, constipation, dysphagia, dizziness, sour taste in the mouth, leg swelling, leg pain, itchy eyes, itchy ears, heartburn, reflux.

## 2020-12-02 NOTE — PROCEDURE
[FreeTextEntry1] : Chest CT (9/23/2020) reveals progression of lung fibrosis and slight enlargement of fissural node along anterior right minor fissure.\par \par Full PFT reveals normal flows; FEV1 was 2.72L which is 135% of predicted; normal lung volumes; unable to diffuse.

## 2020-12-02 NOTE — ADDENDUM
[FreeTextEntry1] : Documented by Devon Hughes acting as a scribe for Dr. Talon Crowell on 12/02/2020 \par \par All medical record entries made by the Scribe were at my, Dr. Talon Crowell's, direction and personally dictated by me on 12/02/2020 . I have reviewed the chart and agree that the record accurately reflects my personal performance of the history, physical exam, assessment and plan. I have also personally directed, reviewed, and agree with the discharge instructions.

## 2020-12-02 NOTE — PHYSICAL EXAM
[No Acute Distress] : no acute distress [Normal Oropharynx] : normal oropharynx [Normal Appearance] : normal appearance [No Neck Mass] : no neck mass [Normal Rate/Rhythm] : normal rate/rhythm [Normal S1, S2] : normal s1, s2 [No Murmurs] : no murmurs [No Resp Distress] : no resp distress [No Abnormalities] : no abnormalities [Benign] : benign [Normal Gait] : normal gait [No Clubbing] : no clubbing [No Cyanosis] : no cyanosis [No Edema] : no edema [FROM] : FROM [Normal Color/ Pigmentation] : normal color/ pigmentation [No Focal Deficits] : no focal deficits [Oriented x3] : oriented x3 [Normal Affect] : normal affect [II] : Mallampati Class: II [TextBox_2] : on supplemental oxygen [TextBox_68] : I:E ratio 1:3; inspiratory crackles1/3 of the way up

## 2020-12-02 NOTE — ASSESSMENT
[FreeTextEntry1] : Mr. Benítez is a 85 year old male with a history of ILDz, ?UIP, COPD, asbestos exposure is stable from a pulmonary perspective, with some dyspnea when walking up stairs or riding bike up inclines. He presents mildly symptomatic with SOB and a cough - He is currently on Ofev therapy for UIP and is tolerating it well - He is currently s/p COPD exacerbation and acute Bronchitis - 11/19 - s/p awaiting urolift procedure 5/2020. S/p flair - progressive resp failure due to UIP. \par \par His shortness of breath is multifactorial due to:\par -mild pulmonary fibrosis -(stable)\par -COPD\par -pulmonary hypertension\par -overweight\par -poor breathing mechanics\par \par \par problem 1a: COPD -(s/p flair 7/2020)\par - continue Singulair 10 mg QHS\par -continue Anoro at 1 inhalation QD\par -continue Albuterol via Hand held nebulizer, Q6H \par -continue Perforomist BID by nebulizer, PRN  (gargle and rinse after use)\par -continue Pulmicort (Budesonide) (0.5) BID by nebulizer, PRN  (gargle and rinse after use)\par -Continue on 10 mg of Prednisone\par Information sheet given to the patient to be reviewed, this medication is never to be used without consulting the prescribing physician. Proper dietary restraint is necessary specifically salt containing foods, if any reaction may occur should be reported. \par \par -COPD is a progressive disease and although it can’t be cured , appropriate management can slow its progression, reduce frequency and severity of exacerbations, and improve symptoms and the patient quality of life. Hospitalizations are the greatest contributor to the total COPD costs and account for up to 87% of total COPD related costs. Exacerbations are the main cause of admissions and subsequently account for the 40-75% of COPD costs. Inhaled maintenance therapy reduces the incidence of exacerbations in patients with stable COPD. Incorrect inhaler use and nonadherence are major obstacles to achieving COPD treatment goals. Many COPD patients have challenges (impaired inhalation, limited dexterity, reduced cognition: that limit their ability to correctly use their COPD treatment devices resulting in reduced symptom control. Of most importance is smoking cessation and early intervention with respiratory illnesses and contemplation for pulmonary rehab to enhance quality of life. \par -Inhaler technique reviewed as well as oral hygiene techniques reviewed with patient. Avoidance of cold air, extremes of temperature, rescue inhaler should be used before exercise. Order of medication reviewed with patient. Recommended use of a cool mist humidifier in the bedroom. \par \par problem 2: asbestos exposure and former smoking, stable (lung cancer screening)\par -follow up chest CT in 5/2020 (on hold due to Covid pandemic)\par -All patients and their families are at risk for asbestos related lung diseases including malignancy (lung cancer, mesothelioma, abdominal cancers as well), benign asbestos pleural effusions, pleural plaques, and asbestosis. Yearly CT of chest will be needed for enhance early diagnosis of these entities as well as yearly pulmonary function tests includes DLCO \par -Lung Cancer Screening is recommended for people between the ages of 55 and 80 with prior 30+ pack year smoking histories. There is irrefutable evidence for realization of lung cancer screening based on two large randomized control trials demonstrating relative reduction in lung cancer mortality for patients undergoing low-dose CT scanning. Risks and benefits reviewed with the patient. \par \par problem 3: VANE\par -continue to use Breath Right strips, or the chin strap\par -ProVent is recommended \par -recommended to use a Oxy-Aid by [employer]\par -Discussed the risks/associations with coronary artery disease, atrial fibrillation, arrhythmia, memory loss, issues with concentration, stroke risk, hypertension, nocturia, chronic reflux/Disla’s esophagus some but not all inclusive. Treatment options discussed including CPAP/BiPAP machine, oral appliance, ProVent therapy, Oxy-Aid by [employer], new technologies, or positional sleep.\par \par problem 4: overweight (resolved)\par -Weight loss, exercise, and diet control were discussed and are highly encouraged. Treatment options were given such as, aqua therapy, and contacting a nutritionist. Recommended to use the elliptical, stationary bike, less use of treadmill. \par \par problem 5: cough /  sensory neuropathic cough\par -recommended to use a humidifier in the bedroom\par -recommended to hydrate\par -recommended to use Mouth Kote \par -recommended to use Xlear nasal saline spray \par -continue Neurontin 100 mg up to TID vs. -continue Amitriptyline 10 mg up to TID, QHS (either one)\par -Sensory neuropathic cough is an etiology of cough that is often realized once someone has been ruled out for common disease such as: asthma, COPD, eosinophilic bronchitis, bronchiectasis, post nasal drip, and GERD. It sometimes develops following a URI, herpes zoster outbreak in pharynx or thyroid or cervical spine injury. However, many patients have no identifiable antecedent explanation. \par \par problem 6: pulmonary fibrosis / UIP- progression.\par -continue Ofev 150 mg BID (get regular LFTs)\par -no change on CT from 2015-Present\par -complete LFTs every 3 months\par -Prednisone 10mg QD\par -Add CellCept 500 q12h for 2 weeks then 1000 q12h for 2 weeks, then 1500 BID\par -Complete monthly CBCs\par \par -Information sheet given to the patient to be reviewed, this medication is never to be used without consulting the prescribing physician. Proper dietary restraint is necessary specifically salt containing foods, if any reaction may occur should be reported. \par \par problem 7: Bronchiectasis\par -acapella device recommended\par -Bronchiectasis is a condition that results in irreversible damage to one or more of the conducting bronchi or airways. Its symptoms, include cough, daily production of mucopurulent sputum, dyspnea, and occasionally, episodic hemoptysis. Severe cases of bronchiectasis can result in progressive impairment with respiratory failure. Bronchiectasis is usually the result of severe or repeated respiratory infections, and most commonly presents as a focal process involving a lobe, segment, or sub-segment of the lung. For some patients, it may present as a diffuse process involving both lungs. Theses cases occur most often in patients with genetic, immunological, or anatomic defects that affect the airway. Diagnosis is usually based on a review of symptoms, including daily cough, and production of copious amounts of sputum, and characteristic CT scan findings, such as bronchial wall thickening and luminal dilatations. It is often treated with antibiotics and airway clearance measures such as chest physiotherapy. In addition, treatment of underlying disorders is important when possible.\par -Seen on the CT of the chest or chest x-ray signifies damaged bronchial tubes focal or diffuse which can be sites of recurrent infections. These areas can be colonized by various organisms including bacteria (haemophilus influenzae/Pseudomonas species etc.) as well as acid fast bacilli (mycobacterial disease- inclusive of TB/OSCAR etc.). Sputum either for bacteria culture/sensitivity or AFB culture and sensitivity will need to be sent if the patient has sputum- 3 specimens on consecutive days will need to be dropped at the laboratory- if the patient can produce sputum. \par \par problem 8: GERD\par -continue Nexium 40 mg QD\par Things to avoid including overeating, spicy foods, tight clothing, eating within three hours of bed, this list is not all inclusive. \par -Rule of 2's: avoid eating too much, eating too late, eating too spicy, eating two hours before bed\par -For treatment of reflux, possible options discussed including diet control, H2 blockers, PPIs, as well as coating motility agents discussed as treatment options. Timing of meals and proximity of last meal to sleep were discussed. If symptoms persist, a formal gastrointestinal evaluation is needed. \par \par Problem 9: COVID-19\par -Recommended Zinc, Vitamin D 2000, Vitmain C 1000, tonic water 8oz and Tylenol QID, QD\par -Due to the short supply of COVID19 testing out right now- advised pt that under the advisement of the ID department at [facility]- the recommendation is that all patients with symptoms of suspected coronavirus no longer seek testing and treat symptoms at home while self-quarantined, as long as they are stable.\par Recommendations to patients with possible or confirmed COVID19:\par 1. Stay home and away from public places. If you must go out, avoid using any kind of public transportation, ridesharing, or taxis.\par 2. Monitor your symptoms carefully. If your symptoms get worse, call your healthcare provider immediately.\par 3. Get rest and stay hydrated.\par 4. Monitor temperature- treat with Tylenol 650 mg Q 6 hours up to 1000 mg TID-QID but not\par exceed 3500 mg daily for liver precautions. Avoid use of NSAIDs.\par 5. Be sure to continue to take your maintenance medications for chronic conditions.\par As much as possible, stay in a specific room and away from other people in your home. Also, you should use a separate bathroom, if available. If you need to be around other people in or outside of the home, wear a facemask.\par If you develop emergency warning signs for serious complications for COVID-19 get medical attention immediately. Emergency warning signs include*:\par -Trouble breathing/worsening- unresolved SOB -Persistent pain or pressure in the chest\par -New confusion or inability to arouse\par -Bluish lips or face\par -Worsening fatigue/malaise -Inability to drink/stay hydrated \par -High fever unresponsive to Tylenol\par Advised to keep us posted. \par \par problem 10: health maintenance \par -s/p influenza vaccine - 2020\par -recommended strep pneumonia vaccines: Prevnar-13 vaccine, followed by Pneumo vaccine 23 one year following\par -recommended early intervention for URIs\par -recommended regular osteoporosis evaluations\par -recommended early dermatological evaluations\par -recommended after the age of 50 to the age of 70, colonoscopy every 5 years \par \par F/U in 3 months\par -with PFTs and 6 minute walk\par He is encouraged to call with any changes, concerns, or questions

## 2021-02-05 NOTE — HISTORY OF PRESENT ILLNESS
[FreeTextEntry1] : Mr. Benítez is a 83 year old male with a history of abnormal chest CT, bronchiectasis, COPD with asthma, GERD, ILDz, lung nodule, VANE, PND, SOB, and UIP presenting to the office today for a follow up visit. His chief complaint is cough / SOB.\par -he has been feeling sick for about 2-3 weeks and has been feeling SOB and coughing persistently. his wife states that he coughs while asleep as well, however the cough is worse during the day. he has not had any fever, chills, or sweats. he has been bringing up a significant amount of yellow mucus\par -he reports that he has firm stools \par -his energy level has been poor\par -his appetite has been poor\par -he has been using Nyquil at night, and was prescribed Zithromax on October 17th. he has been using his Performist nebulizer, followed by Budesonide 30 minutes later\par -he denies any headaches, nausea, vomiting, fever, chills, sweats, chest pain, chest pressure, diarrhea, constipation, dysphagia, dizziness, leg swelling, leg pain, itchy eyes, itchy ears, heartburn, reflux, or sour taste in the mouth.  Zyclara Pregnancy And Lactation Text: This medication is Pregnancy Category C. It is unknown if this medication is excreted in breast milk.

## 2021-03-01 ENCOUNTER — NON-APPOINTMENT (OUTPATIENT)
Age: 86
End: 2021-03-01

## 2021-03-02 ENCOUNTER — APPOINTMENT (OUTPATIENT)
Dept: PULMONOLOGY | Facility: CLINIC | Age: 86
End: 2021-03-02

## 2021-03-10 NOTE — ASU PATIENT PROFILE, ADULT - HEALTHCARE QUESTIONS, PROFILE
Plan: Keep watch. Patient experiencing tenderness Detail Level: Zone spoke with  and anesthesiologist prior to procedure Plan: New Rx- clobetasol 0.05 % scalp solution Qd\\nSig: Apply to affected areas on scalp twice daily for up to 1-2 weeks, then as needed for flares of rash.\\n\\nNew Rx-  ketoconazole 2 % shampoo Weekly\\nSig: Lather and apply to affected areas on scalp for 3-5 minutes, then rinse. Use 3-4 times a week until condition stable.

## 2021-11-23 NOTE — END OF VISIT
[FreeTextEntry3] : I have spent 15 minutes of time on the phone with the patient with their consent.\par  No

## 2023-05-30 NOTE — ASU PATIENT PROFILE, ADULT - FALLEN IN THE PAST
Pre-operative History and Physical    Patient: Randal Flores  : 1944  Acct#:     Intended Procedure: EGD    HISTORY OF PRESENT ILLNESS:  The patient is a 69 y.o. male  who presents for EGD due to dysphagia with hoarseness, chronic cough and clearing of throat. Past Medical History:        Diagnosis Date    Hyperlipidemia     Hypertension     Idiopathic pulmonary fibrosis ([address]     Pacemaker     [employer]     Past Surgical History:        Procedure Laterality Date    AORTIC VALVE REPLACEMENT      BACK SURGERY      bone chip removed    COLONOSCOPY  2018    Dr Robin Amado, polyps    Miladydave Aguilera Left      Medications Prior to Admission:   No current facility-administered medications on file prior to encounter. Current Outpatient Medications on File Prior to Encounter   Medication Sig Dispense Refill    benazepril (LOTENSIN) 40 MG tablet Take 1 tablet by mouth daily      amLODIPine (NORVASC) 5 MG tablet Take 1 tablet by mouth daily      pravastatin (PRAVACHOL) 80 MG tablet Take 1 tablet by mouth daily      Multiple Vitamins-Minerals (THERAPEUTIC MULTIVITAMIN-MINERALS) tablet Take 1 tablet by mouth daily      Glucosamine-Chondroit-Vit C-Mn (GLUCOSAMINE 1500 COMPLEX PO) Take by mouth daily          Allergies:  Patient has no known allergies. Social History:   TOBACCO:   reports that he quit smoking about 50 years ago. His smoking use included cigarettes. He smoked an average of 1 pack per day. He has never used smokeless tobacco.  ETOH:   reports that he does not currently use alcohol. DRUGS:   reports no history of drug use. PHYSICAL EXAM:      Vital Signs: /81   Pulse 87   Temp 97.7 °F (36.5 °C) (Temporal)   Resp 16   Ht 5' 10\" (1.778 m)   Wt 136 lb 14.5 oz (62.1 kg)   SpO2 95%   BMI 19.64 kg/m²    Airway: No stridor or wheezing noted. Good air movement  Pulmonary: without wheezes.   Clear to
no
